# Patient Record
Sex: FEMALE | Race: OTHER | NOT HISPANIC OR LATINO | URBAN - METROPOLITAN AREA
[De-identification: names, ages, dates, MRNs, and addresses within clinical notes are randomized per-mention and may not be internally consistent; named-entity substitution may affect disease eponyms.]

---

## 2017-08-15 ENCOUNTER — EMERGENCY (EMERGENCY)
Facility: HOSPITAL | Age: 22
LOS: 1 days | Discharge: HOME | End: 2017-08-15
Admitting: EMERGENCY MEDICINE
Payer: MEDICAID

## 2017-08-15 VITALS
RESPIRATION RATE: 16 BRPM | TEMPERATURE: 99 F | OXYGEN SATURATION: 100 % | SYSTOLIC BLOOD PRESSURE: 119 MMHG | DIASTOLIC BLOOD PRESSURE: 74 MMHG | HEART RATE: 81 BPM

## 2017-08-15 PROCEDURE — 71020: CPT | Mod: 26

## 2017-08-15 PROCEDURE — 93010 ELECTROCARDIOGRAM REPORT: CPT

## 2017-08-15 PROCEDURE — 99284 EMERGENCY DEPT VISIT MOD MDM: CPT | Mod: 25

## 2017-08-15 NOTE — ED PROVIDER NOTE - CARE PLAN
Principal Discharge DX:	Muscle pain  Instructions for follow-up, activity and diet:	Follow up with your primary care provider in 48 hours.   Return to the Emergency Department for any worsening symptoms, shortness of breath, chest pain, cough, or any concerning symptoms.  Take Ibuprofen 400mg every 8 hours as needed for pain.

## 2017-08-15 NOTE — ED PROVIDER NOTE - MUSCULOSKELETAL MINIMAL EXAM
TENDERNESS/motor intact/atraumatic/normal range of motion/neck supple/point tenderness of right lower intercostals

## 2017-08-15 NOTE — ED PROVIDER NOTE - OBJECTIVE STATEMENT
22y F with no pmhx presenting with right chest wall pain x3 days, pain began suddenly, worse with deep inhalation, leaning forward, and changing position, better with sitting upright. Tried Tums and Mylanta with no relief. Denies chest pain, shortness of breath, cough, abdominal pain, nausea, vomiting, diarrhea, dysuria, headache and any other complaints. Denies OCP use, hx of travel, hx of immobility, and smoking. Pt does not remember doing any strenuous activity prior to the onset of pain. 22y F with no pmhx presenting with right chest wall pain x3 days, pain began suddenly, worse with deep inhalation, leaning forward, and changing position, better with sitting upright. Tried Tums and Mylanta with no relief. Denies shortness of breath, cough, abdominal pain, nausea, vomiting, diarrhea, dysuria, headache and any other complaints. Denies OCP use, hx of travel, hx of immobility, and smoking. Pt does not remember doing any strenuous activity prior to the onset of pain.

## 2017-08-15 NOTE — ED PROVIDER NOTE - PLAN OF CARE
Follow up with your primary care provider in 48 hours.   Return to the Emergency Department for any worsening symptoms, shortness of breath, chest pain, cough, or any concerning symptoms.  Take Ibuprofen 400mg every 8 hours as needed for pain.

## 2017-08-15 NOTE — ED PROVIDER NOTE - MEDICAL DECISION MAKING DETAILS
22yF with no pmhx presenting with right chest wall pain x3 days duration, worse with inspiration and position change. PERC negative, likely MSK w/point tenderness. Will order chest xray, given chest pain will order EKG. Will reassess

## 2017-08-15 NOTE — ED PROVIDER NOTE - PROGRESS NOTE DETAILS
YOSSI Mendez, reassessed pt in results waiting, discussed normal chest xray and normal EKG results, pain is most likely muscular, discussed taking ibuprofen 400mg every 8 hours to reduce inflammation and pain, pt agrees with plan, discussed following up with primary care provider within 48 hours, return if symptoms worsen YOSSI Finn: I have personally seen and examined this patient. I agree with the above plan of care.

## 2018-09-19 ENCOUNTER — TRANSCRIPTION ENCOUNTER (OUTPATIENT)
Age: 23
End: 2018-09-19

## 2019-06-19 ENCOUNTER — TRANSCRIPTION ENCOUNTER (OUTPATIENT)
Age: 24
End: 2019-06-19

## 2020-09-14 ENCOUNTER — ASOB RESULT (OUTPATIENT)
Age: 25
End: 2020-09-14

## 2020-09-14 ENCOUNTER — APPOINTMENT (OUTPATIENT)
Dept: ANTEPARTUM | Facility: CLINIC | Age: 25
End: 2020-09-14
Payer: COMMERCIAL

## 2020-09-14 PROBLEM — Z00.00 ENCOUNTER FOR PREVENTIVE HEALTH EXAMINATION: Status: ACTIVE | Noted: 2020-09-14

## 2020-09-14 PROCEDURE — 76813 OB US NUCHAL MEAS 1 GEST: CPT

## 2020-09-14 PROCEDURE — 36416 COLLJ CAPILLARY BLOOD SPEC: CPT

## 2020-09-14 PROCEDURE — 76801 OB US < 14 WKS SINGLE FETUS: CPT

## 2020-11-03 ENCOUNTER — ASOB RESULT (OUTPATIENT)
Age: 25
End: 2020-11-03

## 2020-11-03 ENCOUNTER — APPOINTMENT (OUTPATIENT)
Dept: ANTEPARTUM | Facility: CLINIC | Age: 25
End: 2020-11-03
Payer: COMMERCIAL

## 2020-11-03 PROCEDURE — 99072 ADDL SUPL MATRL&STAF TM PHE: CPT

## 2020-11-03 PROCEDURE — 76811 OB US DETAILED SNGL FETUS: CPT

## 2021-01-20 ENCOUNTER — OUTPATIENT (OUTPATIENT)
Dept: INPATIENT UNIT | Facility: HOSPITAL | Age: 26
LOS: 1 days | Discharge: HOME | End: 2021-01-20

## 2021-01-20 ENCOUNTER — EMERGENCY (EMERGENCY)
Facility: HOSPITAL | Age: 26
LOS: 1 days | Discharge: STILL ADMITTED OR ADMINISTRATIVE | End: 2021-01-20
Admitting: EMERGENCY MEDICINE

## 2021-01-20 VITALS
TEMPERATURE: 98 F | DIASTOLIC BLOOD PRESSURE: 84 MMHG | OXYGEN SATURATION: 100 % | HEART RATE: 93 BPM | SYSTOLIC BLOOD PRESSURE: 119 MMHG | RESPIRATION RATE: 18 BRPM

## 2021-01-20 VITALS
SYSTOLIC BLOOD PRESSURE: 109 MMHG | TEMPERATURE: 98 F | DIASTOLIC BLOOD PRESSURE: 72 MMHG | RESPIRATION RATE: 15 BRPM | HEART RATE: 106 BPM

## 2021-01-20 DIAGNOSIS — Z3A.00 WEEKS OF GESTATION OF PREGNANCY NOT SPECIFIED: ICD-10-CM

## 2021-01-20 DIAGNOSIS — O26.899 OTHER SPECIFIED PREGNANCY RELATED CONDITIONS, UNSPECIFIED TRIMESTER: ICD-10-CM

## 2021-01-20 LAB
ALBUMIN SERPL ELPH-MCNC: 3.2 G/DL — LOW (ref 3.3–5)
ALP SERPL-CCNC: 193 U/L — HIGH (ref 40–120)
ALT FLD-CCNC: 20 U/L — SIGNIFICANT CHANGE UP (ref 4–33)
ANION GAP SERPL CALC-SCNC: 12 MMOL/L — SIGNIFICANT CHANGE UP (ref 7–14)
APPEARANCE UR: ABNORMAL
AST SERPL-CCNC: 23 U/L — SIGNIFICANT CHANGE UP (ref 4–32)
BACTERIA # UR AUTO: ABNORMAL
BILIRUB SERPL-MCNC: 0.5 MG/DL — SIGNIFICANT CHANGE UP (ref 0.2–1.2)
BILIRUB UR-MCNC: NEGATIVE — SIGNIFICANT CHANGE UP
BUN SERPL-MCNC: 7 MG/DL — SIGNIFICANT CHANGE UP (ref 7–23)
CALCIUM SERPL-MCNC: 9.2 MG/DL — SIGNIFICANT CHANGE UP (ref 8.4–10.5)
CHLORIDE SERPL-SCNC: 100 MMOL/L — SIGNIFICANT CHANGE UP (ref 98–107)
CO2 SERPL-SCNC: 22 MMOL/L — SIGNIFICANT CHANGE UP (ref 22–31)
COLOR SPEC: SIGNIFICANT CHANGE UP
CREAT SERPL-MCNC: 0.45 MG/DL — LOW (ref 0.5–1.3)
DIFF PNL FLD: NEGATIVE — SIGNIFICANT CHANGE UP
EPI CELLS # UR: 2 /HPF — SIGNIFICANT CHANGE UP (ref 0–5)
GLUCOSE SERPL-MCNC: 112 MG/DL — HIGH (ref 70–99)
GLUCOSE UR QL: NEGATIVE — SIGNIFICANT CHANGE UP
HYALINE CASTS # UR AUTO: 0 /LPF — SIGNIFICANT CHANGE UP (ref 0–7)
KETONES UR-MCNC: NEGATIVE — SIGNIFICANT CHANGE UP
LEUKOCYTE ESTERASE UR-ACNC: ABNORMAL
NITRITE UR-MCNC: NEGATIVE — SIGNIFICANT CHANGE UP
PH UR: 6.5 — SIGNIFICANT CHANGE UP (ref 5–8)
POTASSIUM SERPL-MCNC: 3.8 MMOL/L — SIGNIFICANT CHANGE UP (ref 3.5–5.3)
POTASSIUM SERPL-SCNC: 3.8 MMOL/L — SIGNIFICANT CHANGE UP (ref 3.5–5.3)
PROT SERPL-MCNC: 6.5 G/DL — SIGNIFICANT CHANGE UP (ref 6–8.3)
PROT UR-MCNC: NEGATIVE — SIGNIFICANT CHANGE UP
RBC CASTS # UR COMP ASSIST: 1 /HPF — SIGNIFICANT CHANGE UP (ref 0–4)
SODIUM SERPL-SCNC: 134 MMOL/L — LOW (ref 135–145)
SP GR SPEC: 1.01 — SIGNIFICANT CHANGE UP (ref 1.01–1.02)
UROBILINOGEN FLD QL: SIGNIFICANT CHANGE UP
WBC UR QL: 56 /HPF — HIGH (ref 0–5)

## 2021-01-20 NOTE — ED ADULT TRIAGE NOTE - CHIEF COMPLAINT QUOTE
31 weeks pregnant, experiencing "a lot of uncomfortable symptoms", lower abdominal pain/pressure and total body itchiness due 3/19.  SILVER Weller accepted patient, patient to go to L&D.

## 2021-01-20 NOTE — OB RN TRIAGE NOTE - CHIEF COMPLAINT QUOTE
lower abd pain and itching all over lower abd pain and itching all over since last thurday  being treated for UTI with abx, day#1

## 2021-01-21 VITALS — SYSTOLIC BLOOD PRESSURE: 97 MMHG | HEART RATE: 79 BPM | DIASTOLIC BLOOD PRESSURE: 62 MMHG

## 2021-01-21 NOTE — OB PROVIDER TRIAGE NOTE - HISTORY OF PRESENT ILLNESS
26 y/o pt 31.5  presents to triage with c/o lower abdominal and pelvic pressure for 3-4 days. pt reports intermittent abdominal pain that comes and goes with increased pressure. pt reports pressure worsens when standing up. pt states that she is currently being treated for a UTI and started Amoxicillin on 2021. pt denies any dysuria or hematuria. pt denies any recent vaginal exams or intercourse. Pt also reports intense itching throughout her body that started on 01/15/21. pt reports that the pruritis started on her soles of the feet and then became generalized. pt states the itching intensifies at night. pt denies any rash. pt denies any n/v/d, fever or chills. pt endorses +fetal movement.   AP complicated by:  -UTI on Amoxicillin    NKDA  PMH: denies  PSH: denies  OB: denies  GYN: denies  Social hx: denies  Medications:  PNV  Amoxicillin- pt does not know dose

## 2021-01-21 NOTE — OB PROVIDER TRIAGE NOTE - NSOBPROVIDERNOTE_OBGYN_ALL_OB_FT
Plan:  -STAT CMP, Bile acids  -STAT urinalysis   -Will continue to monitor patient    @0013  d/w with Dr Leung Plan:  -STAT CMP, Bile acids  -STAT urinalysis   -Will continue to monitor patient    @0013  d/w with Dr Leung  Maternal and fetal status reassuring   Suspicion for cholestasis   Plan:  -Patient cleared for discharge  -Patient will follow up within 48 hours with ob/gyn  -Bile acids pending, will follow up with patient regarding any positive results  -Patient will continue home medications   -Fetal kick counts reviewed  - labor precautions reviewed  -Patient to increase hydration

## 2021-01-21 NOTE — OB PROVIDER TRIAGE NOTE - NSHPPHYSICALEXAM_GEN_ALL_CORE
Vital Signs Last 24 Hrs  T(C): 36.7 (20 Jan 2021 22:49), Max: 36.7 (20 Jan 2021 22:49)  T(F): 98.1 (20 Jan 2021 22:49), Max: 98.1 (20 Jan 2021 22:49)  HR: 88 (20 Jan 2021 23:28) (88 - 106)  BP: 101/80 (20 Jan 2021 23:28) (101/80 - 119/84)  BP(mean): --  RR: 15 (20 Jan 2021 22:49) (15 - 18)  SpO2: 100% (20 Jan 2021 22:25) (100% - 100%)    General: A&O x4  Abdomen: soft, non tender. no guarding or rebound tenderness.  negative CVA tenderness  SSE:  cervix appears long and closed  no active bleeding noted in vault  TVS: 4.04cm-4.07cm, no funneling or dynamic changes  TAS: BPP 8/8, CHAR 7.66cm, posterior placenta, vertex presentation, 1873gm    NST reactive with moderate variability, cat 1   toco ctx x4

## 2021-01-21 NOTE — OB PROVIDER TRIAGE NOTE - ADDITIONAL INSTRUCTIONS
-Patient will follow up within 48 hours with ob/gyn  -Bile acids pending, will follow up with patient regarding any positive results  -Patient will continue home medications   -Fetal kick counts reviewed  - labor precautions reviewed  -Patient to increase hydration

## 2021-01-21 NOTE — OB PROVIDER TRIAGE NOTE - NSHPLABSRESULTS_GEN_ALL_CORE
Urinalysis Basic - ( 2021 23:28 )    Color: Light Yellow / Appearance: Slightly Turbid / S.011 / pH: x  Gluc: x / Ketone: Negative  / Bili: Negative / Urobili: <2 mg/dL   Blood: x / Protein: Negative / Nitrite: Negative   Leuk Esterase: Large / RBC: 1 /HPF / WBC 56 /HPF   Sq Epi: x / Non Sq Epi: 2 /HPF / Bacteria: Few        134<L>  |  100  |  7   ----------------------------<  112<H>  3.8   |  22  |  0.45<L>    Ca    9.2      2021 23:28    TPro  6.5  /  Alb  3.2<L>  /  TBili  0.5  /  DBili  x   /  AST  23  /  ALT  20  /  AlkPhos  193<H>

## 2021-01-22 LAB — BILE AC FLD-MCNC: 21.8 UMOL/L — HIGH (ref 0–10)

## 2021-02-08 ENCOUNTER — INPATIENT (INPATIENT)
Facility: HOSPITAL | Age: 26
LOS: 1 days | Discharge: HOME | End: 2021-02-10
Attending: OBSTETRICS & GYNECOLOGY | Admitting: OBSTETRICS & GYNECOLOGY

## 2021-02-08 VITALS
DIASTOLIC BLOOD PRESSURE: 74 MMHG | RESPIRATION RATE: 16 BRPM | SYSTOLIC BLOOD PRESSURE: 116 MMHG | TEMPERATURE: 98 F | HEART RATE: 95 BPM

## 2021-02-08 DIAGNOSIS — Z3A.00 WEEKS OF GESTATION OF PREGNANCY NOT SPECIFIED: ICD-10-CM

## 2021-02-08 DIAGNOSIS — O26.899 OTHER SPECIFIED PREGNANCY RELATED CONDITIONS, UNSPECIFIED TRIMESTER: ICD-10-CM

## 2021-02-08 LAB
ALBUMIN SERPL ELPH-MCNC: 3.3 G/DL — SIGNIFICANT CHANGE UP (ref 3.3–5)
ALP SERPL-CCNC: 279 U/L — HIGH (ref 40–120)
ALT FLD-CCNC: 20 U/L — SIGNIFICANT CHANGE UP (ref 4–33)
ANION GAP SERPL CALC-SCNC: 12 MMOL/L — SIGNIFICANT CHANGE UP (ref 7–14)
APPEARANCE UR: ABNORMAL
APTT BLD: 25.9 SEC — LOW (ref 27–36.3)
AST SERPL-CCNC: 18 U/L — SIGNIFICANT CHANGE UP (ref 4–32)
BASOPHILS # BLD AUTO: 0.05 K/UL — SIGNIFICANT CHANGE UP (ref 0–0.2)
BASOPHILS NFR BLD AUTO: 0.6 % — SIGNIFICANT CHANGE UP (ref 0–2)
BILIRUB SERPL-MCNC: 0.4 MG/DL — SIGNIFICANT CHANGE UP (ref 0.2–1.2)
BILIRUB UR-MCNC: NEGATIVE — SIGNIFICANT CHANGE UP
BLD GP AB SCN SERPL QL: NEGATIVE — SIGNIFICANT CHANGE UP
BUN SERPL-MCNC: 6 MG/DL — LOW (ref 7–23)
CALCIUM SERPL-MCNC: 8.9 MG/DL — SIGNIFICANT CHANGE UP (ref 8.4–10.5)
CHLORIDE SERPL-SCNC: 101 MMOL/L — SIGNIFICANT CHANGE UP (ref 98–107)
CO2 SERPL-SCNC: 22 MMOL/L — SIGNIFICANT CHANGE UP (ref 22–31)
COLOR SPEC: SIGNIFICANT CHANGE UP
CREAT ?TM UR-MCNC: 47 MG/DL — SIGNIFICANT CHANGE UP
CREAT SERPL-MCNC: 0.43 MG/DL — LOW (ref 0.5–1.3)
DIFF PNL FLD: NEGATIVE — SIGNIFICANT CHANGE UP
EOSINOPHIL # BLD AUTO: 0.08 K/UL — SIGNIFICANT CHANGE UP (ref 0–0.5)
EOSINOPHIL NFR BLD AUTO: 0.9 % — SIGNIFICANT CHANGE UP (ref 0–6)
FIBRINOGEN PPP-MCNC: 760 MG/DL — HIGH (ref 290–520)
GLUCOSE SERPL-MCNC: 77 MG/DL — SIGNIFICANT CHANGE UP (ref 70–99)
GLUCOSE UR QL: NEGATIVE — SIGNIFICANT CHANGE UP
HCT VFR BLD CALC: 37.3 % — SIGNIFICANT CHANGE UP (ref 34.5–45)
HGB BLD-MCNC: 11.5 G/DL — SIGNIFICANT CHANGE UP (ref 11.5–15.5)
IANC: 5.24 K/UL — SIGNIFICANT CHANGE UP (ref 1.5–8.5)
IMM GRANULOCYTES NFR BLD AUTO: 0.6 % — SIGNIFICANT CHANGE UP (ref 0–1.5)
INR BLD: 0.98 RATIO — SIGNIFICANT CHANGE UP (ref 0.88–1.16)
KETONES UR-MCNC: NEGATIVE — SIGNIFICANT CHANGE UP
LDH SERPL L TO P-CCNC: 197 U/L — SIGNIFICANT CHANGE UP (ref 135–225)
LEUKOCYTE ESTERASE UR-ACNC: ABNORMAL
LYMPHOCYTES # BLD AUTO: 2.35 K/UL — SIGNIFICANT CHANGE UP (ref 1–3.3)
LYMPHOCYTES # BLD AUTO: 27.4 % — SIGNIFICANT CHANGE UP (ref 13–44)
MCHC RBC-ENTMCNC: 27 PG — SIGNIFICANT CHANGE UP (ref 27–34)
MCHC RBC-ENTMCNC: 30.8 GM/DL — LOW (ref 32–36)
MCV RBC AUTO: 87.6 FL — SIGNIFICANT CHANGE UP (ref 80–100)
MONOCYTES # BLD AUTO: 0.82 K/UL — SIGNIFICANT CHANGE UP (ref 0–0.9)
MONOCYTES NFR BLD AUTO: 9.5 % — SIGNIFICANT CHANGE UP (ref 2–14)
NEUTROPHILS # BLD AUTO: 5.24 K/UL — SIGNIFICANT CHANGE UP (ref 1.8–7.4)
NEUTROPHILS NFR BLD AUTO: 61 % — SIGNIFICANT CHANGE UP (ref 43–77)
NITRITE UR-MCNC: NEGATIVE — SIGNIFICANT CHANGE UP
NRBC # BLD: 0 /100 WBCS — SIGNIFICANT CHANGE UP
NRBC # FLD: 0 K/UL — SIGNIFICANT CHANGE UP
PH UR: 6.5 — SIGNIFICANT CHANGE UP (ref 5–8)
PLATELET # BLD AUTO: 198 K/UL — SIGNIFICANT CHANGE UP (ref 150–400)
POTASSIUM SERPL-MCNC: 3.7 MMOL/L — SIGNIFICANT CHANGE UP (ref 3.5–5.3)
POTASSIUM SERPL-SCNC: 3.7 MMOL/L — SIGNIFICANT CHANGE UP (ref 3.5–5.3)
PROT ?TM UR-MCNC: 19 MG/DL — SIGNIFICANT CHANGE UP
PROT SERPL-MCNC: 6.7 G/DL — SIGNIFICANT CHANGE UP (ref 6–8.3)
PROT UR-MCNC: ABNORMAL
PROT/CREAT UR-RTO: 0.4 RATIO — HIGH (ref 0–0.2)
PROTHROM AB SERPL-ACNC: 11.3 SEC — SIGNIFICANT CHANGE UP (ref 10.6–13.6)
RBC # BLD: 4.26 M/UL — SIGNIFICANT CHANGE UP (ref 3.8–5.2)
RBC # FLD: 13.3 % — SIGNIFICANT CHANGE UP (ref 10.3–14.5)
RH IG SCN BLD-IMP: POSITIVE — SIGNIFICANT CHANGE UP
RH IG SCN BLD-IMP: POSITIVE — SIGNIFICANT CHANGE UP
SODIUM SERPL-SCNC: 135 MMOL/L — SIGNIFICANT CHANGE UP (ref 135–145)
SP GR SPEC: 1.01 — SIGNIFICANT CHANGE UP (ref 1.01–1.02)
URATE SERPL-MCNC: 3.4 MG/DL — SIGNIFICANT CHANGE UP (ref 2.5–7)
UROBILINOGEN FLD QL: SIGNIFICANT CHANGE UP
WBC # BLD: 8.59 K/UL — SIGNIFICANT CHANGE UP (ref 3.8–10.5)
WBC # FLD AUTO: 8.59 K/UL — SIGNIFICANT CHANGE UP (ref 3.8–10.5)

## 2021-02-08 RX ORDER — AMOXICILLIN 250 MG/5ML
0 SUSPENSION, RECONSTITUTED, ORAL (ML) ORAL
Qty: 0 | Refills: 0 | DISCHARGE

## 2021-02-08 RX ORDER — FOLIC ACID 0.8 MG
1 TABLET ORAL DAILY
Refills: 0 | Status: DISCONTINUED | OUTPATIENT
Start: 2021-02-08 | End: 2021-02-08

## 2021-02-09 ENCOUNTER — OUTPATIENT (OUTPATIENT)
Dept: OUTPATIENT SERVICES | Facility: HOSPITAL | Age: 26
LOS: 1 days | End: 2021-02-09

## 2021-02-09 ENCOUNTER — APPOINTMENT (OUTPATIENT)
Dept: ANTEPARTUM | Facility: HOSPITAL | Age: 26
End: 2021-02-09
Payer: COMMERCIAL

## 2021-02-09 ENCOUNTER — ASOB RESULT (OUTPATIENT)
Age: 26
End: 2021-02-09

## 2021-02-09 DIAGNOSIS — K83.1 OBSTRUCTION OF BILE DUCT: ICD-10-CM

## 2021-02-09 LAB
ALBUMIN SERPL ELPH-MCNC: 3.1 G/DL — LOW (ref 3.3–5)
ALP SERPL-CCNC: 250 U/L — HIGH (ref 40–120)
ALT FLD-CCNC: 20 U/L — SIGNIFICANT CHANGE UP (ref 4–33)
ANION GAP SERPL CALC-SCNC: 12 MMOL/L — SIGNIFICANT CHANGE UP (ref 7–14)
AST SERPL-CCNC: 17 U/L — SIGNIFICANT CHANGE UP (ref 4–32)
BILIRUB SERPL-MCNC: 0.4 MG/DL — SIGNIFICANT CHANGE UP (ref 0.2–1.2)
BUN SERPL-MCNC: 7 MG/DL — SIGNIFICANT CHANGE UP (ref 7–23)
CALCIUM SERPL-MCNC: 8.7 MG/DL — SIGNIFICANT CHANGE UP (ref 8.4–10.5)
CHLORIDE SERPL-SCNC: 102 MMOL/L — SIGNIFICANT CHANGE UP (ref 98–107)
CO2 SERPL-SCNC: 19 MMOL/L — LOW (ref 22–31)
CREAT SERPL-MCNC: 0.44 MG/DL — LOW (ref 0.5–1.3)
GLUCOSE SERPL-MCNC: 158 MG/DL — HIGH (ref 70–99)
POTASSIUM SERPL-MCNC: 4 MMOL/L — SIGNIFICANT CHANGE UP (ref 3.5–5.3)
POTASSIUM SERPL-SCNC: 4 MMOL/L — SIGNIFICANT CHANGE UP (ref 3.5–5.3)
PROT SERPL-MCNC: 6.5 G/DL — SIGNIFICANT CHANGE UP (ref 6–8.3)
SARS-COV-2 RNA SPEC QL NAA+PROBE: SIGNIFICANT CHANGE UP
SODIUM SERPL-SCNC: 133 MMOL/L — LOW (ref 135–145)
T PALLIDUM AB TITR SER: NEGATIVE — SIGNIFICANT CHANGE UP

## 2021-02-09 PROCEDURE — 76819 FETAL BIOPHYS PROFIL W/O NST: CPT | Mod: 26

## 2021-02-09 PROCEDURE — 76816 OB US FOLLOW-UP PER FETUS: CPT | Mod: 26

## 2021-02-09 RX ORDER — URSODIOL 250 MG/1
300 TABLET, FILM COATED ORAL EVERY 8 HOURS
Refills: 0 | Status: DISCONTINUED | OUTPATIENT
Start: 2021-02-09 | End: 2021-02-09

## 2021-02-09 RX ORDER — URSODIOL 250 MG/1
600 TABLET, FILM COATED ORAL EVERY 12 HOURS
Refills: 0 | Status: DISCONTINUED | OUTPATIENT
Start: 2021-02-09 | End: 2021-02-10

## 2021-02-09 RX ORDER — FOLIC ACID 0.8 MG
1 TABLET ORAL DAILY
Refills: 0 | Status: DISCONTINUED | OUTPATIENT
Start: 2021-02-09 | End: 2021-02-09

## 2021-02-09 RX ADMIN — URSODIOL 300 MILLIGRAM(S): 250 TABLET, FILM COATED ORAL at 05:55

## 2021-02-09 RX ADMIN — URSODIOL 600 MILLIGRAM(S): 250 TABLET, FILM COATED ORAL at 18:20

## 2021-02-09 RX ADMIN — Medication 12 MILLIGRAM(S): at 00:50

## 2021-02-09 NOTE — PROGRESS NOTE ADULT - ATTENDING COMMENTS
Patient seen at bedside. Agree with above note. Continue betamethasone course and ursodiol. For MFM consult this AM.

## 2021-02-09 NOTE — OB PROVIDER H&P - ASSESSMENT
24 yo  at [id number] admitted with ICP, bile acids 21.8 () -> 203 (). Reactive NST, BPP . LFTs wnl.   - Admit to antepartum for AM MFM consult   - BMZ for fetal lung maturity   - Continue Ursodiol 300 TID    - NST BID  - Reg diet     E Demirel PGY3  d/w Dr. Mustafa and Dr. Leung

## 2021-02-09 NOTE — OB PROVIDER H&P - HISTORY OF PRESENT ILLNESS
24 yo  at [id number] sent in from the office for elevated bile acids to 203 drawn on , increased from 21.8 on  when patient presented to L&D triage for generalized body pruritus that started on her soles. She reports improved pruritus currently on Ursodiol. She denies rashes, abdominal pain, N/V, HA, changes in vision, lightheadedness, dizziness. Denies ctx, LOF, VB and endorses good FM.     OBhx: P0   GYN: denies  PMHx: denies  PSHx: denies  Social hx: denies tobacco, alcohol, recreational drugs   Medications: Ursodiol 300 TID, PNV, Vit D   Allergy: Benadryl (angioedema)

## 2021-02-09 NOTE — PROGRESS NOTE ADULT - ASSESSMENT
Assessment: 25 yr  @ 34 4/7 with cholestasis of pregnancy (bile acid 203). No evidence of tranaminities. No evidence of fetal distress     Recommendations    Patient was counseled on the diagnosis of cholestasis of pregnancy's Patient was also counseled that bile acids >100 is associated with a significantly higher risk of stillbirth. At this time, we recommend delivery at 36 weeks gestation (36  0/7 to 36 3/7)   Will obtain ATU sonogram today   Complete BMZ course   If fetal testing continues to remain reassuring can consider outpatient management  Would recommend 3x a week outpatient fetal testing         Discussed with Dr. Woodall ([facility] Attending)     Andrzej Castillo MD   Maternal Fetal Medicine Fellow

## 2021-02-09 NOTE — DISCHARGE NOTE ANTEPARTUM - CARE PROVIDER_API CALL
Man hBatt)  Obstetrics and Gynecology  [address] [place] [address]  Phone: [phone]  Fax: [phone]  Follow Up Time:

## 2021-02-09 NOTE — DISCHARGE NOTE ANTEPARTUM - CARE PLAN
Principal Discharge DX:	Cholestasis  Goal:	Recovery  Assessment and plan of treatment:	- Follow up with  testing unit on  at 8am for NST and BPP then follow up three times a week until scheduled delivery at 36 weeks  - Return to hospital with contractions, vaginal bleeding, leaking fluid or decreased fetal movement   Principal Discharge DX:	Cholestasis  Goal:	Recovery  Assessment and plan of treatment:	- Follow up with:  OB Office at [place] tomorrow  at 130pm   testing unit on  at 8am for NST and BPP  [place] Office on Monday 2/15 at 7am   testing unit on  at 1:30pm  Induction to be scheduled  at 36 weeks  - Return to hospital with contractions, vaginal bleeding, leaking fluid or decreased fetal movement

## 2021-02-09 NOTE — DISCHARGE NOTE ANTEPARTUM - MEDICATION SUMMARY - MEDICATIONS TO CHANGE
I will SWITCH the dose or number of times a day I take the medications listed below when I get home from the hospital:    ursodiol 300 mg oral capsule  -- 1 cap(s) by mouth 2 times a day

## 2021-02-09 NOTE — DISCHARGE NOTE ANTEPARTUM - HOSPITAL COURSE
Patient is a 25 yr  @ 34  admitted secondary to concern elevated bile acids. Patient was previously diagnosed with cholestasis of pregnancy with a bile acid of 21. She experiences pruritis She was started on ursodiol with improvement in symptoms. Repeat bile acid resulted as 203 yesterday. Provider initiated betametahsone course. [facility] :  MFM consulted and recommends delivery at 36 weeks, increase ursidiol to 600mg BID, and three times per week fetal testing until delivery while outpatient.  Patient is a 25 yr  @ 34  admitted secondary to concern elevated bile acids. Patient was previously diagnosed with cholestasis of pregnancy with a bile acid of 21. She experiences pruritis She was started on ursodiol with improvement in symptoms. Repeat bile acid resulted as 203 yesterday. Provider initiated betametahsone course. ATU sono : Vtx/ posterior placenta/ CHAR 15/ BPP 8/8 ATU sono 2/10: CHAR 19 BPP 8/8. NST reactive. MFM consulted and recommends delivery at 36 weeks, increase ursidiol to 600mg BID, and three times per week fetal testing until delivery while outpatient. Pt stable for discharge with follow up in office tomorrow, Friday and three times next week with scheduled IOL on .

## 2021-02-09 NOTE — PROGRESS NOTE ADULT - ASSESSMENT
Patient is a 26yo  @34w4d a/w ICP overnight (), . Patient hemodynamically stable this am .....    #ICP  -Ursodial 300 TID started   -Labs otherwise wnl on admission??    #FWB  -BMZ (-)  -AM ATU sono  -NST BID    #MWB  -HSQ  -Reg diet     Patient is a 26yo  @34w4d a/w ICP overnight (), . Patient hemodynamically stable this am.    #ICP  -Ursodial 300 TID started   -Labs otherwise wnl on admission  -MFM consult and ATU scan this am    #FWB  -BMZ (-)  -AM ATU sono  -NST BID    #MWB  -HSQ  -Reg joseph Otto, PGY3

## 2021-02-09 NOTE — DISCHARGE NOTE ANTEPARTUM - MEDICATION SUMMARY - MEDICATIONS TO TAKE
I will START or STAY ON the medications listed below when I get home from the hospital:    ursodiol 300 mg oral capsule  -- 2 cap(s) by mouth every 12 hours  -- Indication: For ICP    Prenatal Multivitamins with Folic Acid 1 mg oral tablet  -- 1 tab(s) by mouth once a day  -- Indication: For pregnancy

## 2021-02-09 NOTE — PROGRESS NOTE ADULT - SUBJECTIVE AND OBJECTIVE BOX
MFM Fellow Note     Patient is a 25 yr  @ 34  admitted secondary to concern elevated bile acids. Patient was previously diagnosed withcholestasis of pregnancy with a bile acid of 21. She experiences pruritis She was started on ursodiol with improvement in symptoms. Repeat bile acid resulted as 203 yesterday. Patient presented for evaluation NST yesterday was reactive. Provider initiated betametahsone course.   Patient history is otherwise not significant       Vital Signs Last 24 Hours  T(C): 36.3 (21 @ 05:53), Max: 36.6 (21 @ 22:25)  HR: 87 (21 @ 05:53) (85 - 95)  BP: 105/56 (21 @ 05:53) (104/69 - 116/74)  RR: 18 (21 @ 05:53) (15 - 18)  SpO2: 97% (21 @ 05:53) (97% - 100%)      Physical Exam:  General: NAD      Labs:                        11.5   8.59  )-----------( 198      ( 2021 21:32 )             37.3   baso 0.6    eos 0.9    imm gran 0.6    lymph 27.4   mono 9.5    poly 61.0         135  |  101  |  6<L>  ----------------------------<  77  3.7   |  22  |  0.43<L>    Ca    8.9      2021 21:32    TPro  6.7  /  Alb  3.3  /  TBili  0.4  /  DBili  x   /  AST  18  /  ALT  20  /  AlkPhos  279<H>  -    PT/INR - ( 2021 22:09 )   PT: 11.3 sec;   INR: 0.98 ratio         PTT - ( 2021 22:09 )  PTT:25.9 sec  Urinalysis Basic - ( 2021 21:32 )    Color: Light Yellow / Appearance: Slightly Turbid / S.010 / pH: x  Gluc: x / Ketone: Negative  / Bili: Negative / Urobili: <2 mg/dL   Blood: x / Protein: Trace / Nitrite: Negative   Leuk Esterase: Large / RBC: 2 /HPF / WBC 88 /HPF   Sq Epi: x / Non Sq Epi: 6 /HPF / Bacteria: Moderate        Blood Type: AB Positive      MEDICATIONS  (STANDING):  betamethasone Injectable 12 milliGRAM(s) IntraMuscular every 24 hours  folic acid 1 milliGRAM(s) Oral daily  prenatal multivitamin 1 Tablet(s) Oral daily  ursodiol Capsule 300 milliGRAM(s) Oral every 8 hours    MEDICATIONS  (PRN):        Andrzej Castillo MD

## 2021-02-09 NOTE — OB PROVIDER H&P - NSHPPHYSICALEXAM_GEN_ALL_CORE
Physical Exam:   General: laying comfortably in bed, NAD, AOx3   CV: RRR    Lungs: CTA b/l, good air flow b/l   Back: No CVA tenderness  Abd: soft, gravid, NTND, no rebound or guarding   Pelvic: deferred   Ext: NT b/l, no edema

## 2021-02-09 NOTE — DISCHARGE NOTE ANTEPARTUM - PLAN OF CARE
- Follow up with  testing unit on  at 8am for NST and BPP then follow up three times a week until scheduled delivery at 36 weeks  - Return to hospital with contractions, vaginal bleeding, leaking fluid or decreased fetal movement Recovery - Follow up with:  OB Office at [place] at 130pm   testing unit on  at 8am for NST and BPP  [place] Office on Monday 2/15 at 7am   testing unit on  at 1:30pm  Induction to be scheduled  at 36 weeks  - Return to hospital with contractions, vaginal bleeding, leaking fluid or decreased fetal movement

## 2021-02-09 NOTE — OB PROVIDER H&P - NSHPLABSRESULTS_GEN_ALL_CORE
Vital Signs Last 24 Hrs  T(C): 36.6 (2021 22:30), Max: 36.6 (2021 22:25)  T(F): 97.88 (2021 22:30), Max: 97.9 (2021 22:25)  HR: 90 (2021 22:30) (90 - 95)  BP: 107/59 (2021 22:30) (107/59 - 116/74)  BP(mean): --  RR: 15 (2021 22:25) (15 - 16)  SpO2: --                          11.5   8.59  )-----------( 198      ( 2021 21:32 )             37.3       02-08    135  |  101  |  6<L>  ----------------------------<  77  3.7   |  22  |  0.43<L>    Ca    8.9      2021 21:32    TPro  6.7  /  Alb  3.3  /  TBili  0.4  /  DBili  x   /  AST  18  /  ALT  20  /  AlkPhos  279<H>  02-08      CAPILLARY BLOOD GLUCOSE    LIVER FUNCTIONS - ( 2021 21:32 )  Alb: 3.3 g/dL / Pro: 6.7 g/dL / ALK PHOS: 279 U/L / ALT: 20 U/L / AST: 18 U/L / GGT: x           PT/INR - ( 2021 22:09 )   PT: 11.3 sec;   INR: 0.98 ratio    PTT - ( 2021 22:09 )  PTT:25.9 sec    Urinalysis Basic - ( 2021 21:32 )    Color: Light Yellow / Appearance: Slightly Turbid / S.010 / pH: x  Gluc: x / Ketone: Negative  / Bili: Negative / Urobili: <2 mg/dL   Blood: x / Protein: Trace / Nitrite: Negative   Leuk Esterase: Large / RBC: 2 /HPF / WBC 88 /HPF   Sq Epi: x / Non Sq Epi: 6 /HPF / Bacteria: Moderate

## 2021-02-09 NOTE — PROGRESS NOTE ADULT - SUBJECTIVE AND OBJECTIVE BOX
Patient seen and examined at bedside, no acute overnight events. No acute complaints. Patient endorses good fetal movement. Patient is ambulating and tolerating regular diet. Denies CP, SOB, N/V, fevers, chills, HA, visual disturbances, CTX, LOF, VB or abdominal pain.    Vital Signs Last 24 Hours  T(C): 36.5 (02-09-21 @ 01:27), Max: 36.6 (02-08-21 @ 22:25)  HR: 85 (02-09-21 @ 01:27) (85 - 95)  BP: 104/69 (02-09-21 @ 01:27) (104/69 - 116/74)  RR: 18 (02-09-21 @ 01:27) (15 - 18)  SpO2: 100% (02-09-21 @ 01:27) (100% - 100%)    I&O's Summary      Physical Exam:  General: NAD  CV: S1/S2+,RRR  Lungs: breathing comfortably on RA, CTA  Abdomen: soft, gravid, non-tender  Ext: NTTP, No erythema or edema    Labs:             11.5   8.59  )-----------( 198      ( 02-08 @ 21:32 )             37.3         MEDICATIONS  (STANDING):  betamethasone Injectable 12 milliGRAM(s) IntraMuscular every 24 hours  folic acid 1 milliGRAM(s) Oral daily  prenatal multivitamin 1 Tablet(s) Oral daily  ursodiol Capsule 300 milliGRAM(s) Oral every 8 hours    MEDICATIONS  (PRN):   Patient seen and examined at bedside, no acute overnight events. No acute complaints. Patient reports mild itching this am, which has since resolved s/p ursodiol administration. Patient reports itching has greatly improved outpatient since starting ursodiol. Patient endorses good fetal movement. Patient is ambulating and tolerating regular diet. Denies CP, SOB, N/V, fevers, chills, HA, visual disturbances, CTX, LOF, VB or abdominal pain.    Vital Signs Last 24 Hours  T(C): 36.5 (02-09-21 @ 01:27), Max: 36.6 (02-08-21 @ 22:25)  HR: 85 (02-09-21 @ 01:27) (85 - 95)  BP: 104/69 (02-09-21 @ 01:27) (104/69 - 116/74)  RR: 18 (02-09-21 @ 01:27) (15 - 18)  SpO2: 100% (02-09-21 @ 01:27) (100% - 100%)    I&O's Summary      Physical Exam:  General: NAD  CV: S1/S2+,RRR  Lungs: breathing comfortably on RA, CTA  Abdomen: soft, gravid, non-tender  Ext: NTTP, No erythema or edema    Labs:             11.5   8.59  )-----------( 198      ( 02-08 @ 21:32 )             37.3         MEDICATIONS  (STANDING):  betamethasone Injectable 12 milliGRAM(s) IntraMuscular every 24 hours  folic acid 1 milliGRAM(s) Oral daily  prenatal multivitamin 1 Tablet(s) Oral daily  ursodiol Capsule 300 milliGRAM(s) Oral every 8 hours    MEDICATIONS  (PRN):

## 2021-02-09 NOTE — DISCHARGE NOTE ANTEPARTUM - ADDITIONAL INSTRUCTIONS
- Follow up with:  OB Office at [place] tomorrow  at 130pm   testing unit on  at 8am for NST and BPP  [place] Office on Monday 2/15 at 7am   testing unit on  at 1:30pm  Induction to be scheduled  at 36 weeks

## 2021-02-09 NOTE — DISCHARGE NOTE ANTEPARTUM - PATIENT PORTAL LINK FT
You can access the FollowMyHealth Patient Portal offered by [facility] by registering at the following website: [email or web address]. By joining [employer]’s FollowMyHealth portal, you will also be able to view your health information using other applications (apps) compatible with our system.

## 2021-02-10 ENCOUNTER — ASOB RESULT (OUTPATIENT)
Age: 26
End: 2021-02-10

## 2021-02-10 ENCOUNTER — TRANSCRIPTION ENCOUNTER (OUTPATIENT)
Age: 26
End: 2021-02-10

## 2021-02-10 ENCOUNTER — APPOINTMENT (OUTPATIENT)
Dept: ANTEPARTUM | Facility: HOSPITAL | Age: 26
End: 2021-02-10

## 2021-02-10 ENCOUNTER — OUTPATIENT (OUTPATIENT)
Dept: OUTPATIENT SERVICES | Facility: HOSPITAL | Age: 26
LOS: 1 days | End: 2021-02-10

## 2021-02-10 VITALS
DIASTOLIC BLOOD PRESSURE: 62 MMHG | RESPIRATION RATE: 18 BRPM | OXYGEN SATURATION: 98 % | HEART RATE: 97 BPM | SYSTOLIC BLOOD PRESSURE: 110 MMHG | TEMPERATURE: 98 F

## 2021-02-10 PROBLEM — K83.1 OBSTRUCTION OF BILE DUCT: Chronic | Status: ACTIVE | Noted: 2021-02-08

## 2021-02-10 LAB
SARS-COV-2 IGG SERPL QL IA: NEGATIVE — SIGNIFICANT CHANGE UP
SARS-COV-2 IGM SERPL IA-ACNC: <0.1 INDEX — SIGNIFICANT CHANGE UP

## 2021-02-10 RX ORDER — URSODIOL 250 MG/1
1 TABLET, FILM COATED ORAL
Qty: 0 | Refills: 0 | DISCHARGE

## 2021-02-10 RX ORDER — URSODIOL 250 MG/1
2 TABLET, FILM COATED ORAL
Qty: 0 | Refills: 0 | DISCHARGE
Start: 2021-02-10

## 2021-02-10 RX ADMIN — URSODIOL 600 MILLIGRAM(S): 250 TABLET, FILM COATED ORAL at 06:23

## 2021-02-10 RX ADMIN — Medication 12 MILLIGRAM(S): at 00:26

## 2021-02-10 NOTE — PROGRESS NOTE ADULT - ASSESSMENT
Patient is a 24yo  @34w5d a/w ICP overnight (), . Patient hemodynamically stable this am.    #ICP  -Ursodial 300 TID outpatient increased to 600BID  -Labs wnl   -As patient is now beta complete with discuss with MFM and private obgyn regarding discharge home. Plan for delivery at 36w with 3x weekly ATU testing on discharge    #FWB  -BMZ (-)  -AM ATU sono  -NST BID  -ATU sono ():       #MWB  -HSQ  -Reg diet    Shi Otto, PGY3 Patient is a 24yo  @34w5d a/w ICP overnight (), . Patient hemodynamically stable this am.    #ICP  -Ursodial 300 TID outpatient increased to 600BID  -Labs wnl   -As patient is now beta complete, discharge planing. To follow up in ATU with 3x weekly testing. Plan for delivery at 36w.    #FWB  -BMZ (-)  -ATU sono () Vtx, posterior placenta, Juan J 15.5, 2872g(84%), BPP   -NST BID    #MWB  -HSQ  -Reg diet    Shimarli Otto, PGY3    Shi Otto, PGY3

## 2021-02-10 NOTE — PROGRESS NOTE ADULT - ATTENDING COMMENTS
patient to have NST/BPP today  if above testing is reassuring and patient is for discharge:  she will follow up in the office tomorrow  she will be seen in ATU on 2/12 (Friday)  she will be seen in the office 2/15 (Monday)  she will be seen in ATU on 2/17 (Wednesday)  she will be for IOL @ 36 wks (2/19 Friday)  plan reviewed with patient, will coordinate with office and MFM ATU to schedule

## 2021-02-10 NOTE — PROGRESS NOTE ADULT - SUBJECTIVE AND OBJECTIVE BOX
Patient seen and examined at bedside, no acute overnight events. No acute complaints. Patient endorses good fetal movement. Patient is ambulating and tolerating regular diet. Denies CP, SOB, N/V, fevers, chills, HA, visual disturbances, CTX, LOF, VB or abdominal pain.    Vital Signs Last 24 Hours  T(C): 36.5 (02-10-21 @ 01:33), Max: 36.9 (02-09-21 @ 18:12)  HR: 86 (02-10-21 @ 01:33) (79 - 111)  BP: 101/59 (02-10-21 @ 01:33) (101/59 - 107/65)  RR: 18 (02-10-21 @ 01:33) (16 - 18)  SpO2: 97% (02-10-21 @ 01:33) (96% - 98%)    I&O's Summary      Physical Exam:  General: NAD  CV: S1/S2+,RRR  Lungs: breathing comfortably on RA, CTA  Abdomen: soft, gravid, non-tender  Ext: NTTP, No erythema or edema    Labs:             11.5   8.59  )-----------( 198      ( 02-08 @ 21:32 )             37.3         MEDICATIONS  (STANDING):  prenatal multivitamin 1 Tablet(s) Oral daily  ursodiol Capsule 600 milliGRAM(s) Oral every 12 hours    MEDICATIONS  (PRN):

## 2021-02-11 LAB — BILE AC FLD-MCNC: 8.8 UMOL/L — SIGNIFICANT CHANGE UP (ref 0–10)

## 2021-02-12 ENCOUNTER — APPOINTMENT (OUTPATIENT)
Dept: ANTEPARTUM | Facility: CLINIC | Age: 26
End: 2021-02-12
Payer: COMMERCIAL

## 2021-02-12 ENCOUNTER — ASOB RESULT (OUTPATIENT)
Age: 26
End: 2021-02-12

## 2021-02-12 ENCOUNTER — APPOINTMENT (OUTPATIENT)
Dept: ANTEPARTUM | Facility: HOSPITAL | Age: 26
End: 2021-02-12

## 2021-02-12 PROCEDURE — 76819 FETAL BIOPHYS PROFIL W/O NST: CPT

## 2021-02-12 PROCEDURE — 99072 ADDL SUPL MATRL&STAF TM PHE: CPT

## 2021-02-17 ENCOUNTER — LABORATORY RESULT (OUTPATIENT)
Age: 26
End: 2021-02-17

## 2021-02-17 ENCOUNTER — APPOINTMENT (OUTPATIENT)
Dept: DISASTER EMERGENCY | Facility: CLINIC | Age: 26
End: 2021-02-17

## 2021-02-17 ENCOUNTER — OUTPATIENT (OUTPATIENT)
Dept: OUTPATIENT SERVICES | Facility: HOSPITAL | Age: 26
LOS: 1 days | End: 2021-02-17

## 2021-02-17 ENCOUNTER — ASOB RESULT (OUTPATIENT)
Age: 26
End: 2021-02-17

## 2021-02-17 ENCOUNTER — APPOINTMENT (OUTPATIENT)
Dept: ANTEPARTUM | Facility: HOSPITAL | Age: 26
End: 2021-02-17
Payer: COMMERCIAL

## 2021-02-17 ENCOUNTER — APPOINTMENT (OUTPATIENT)
Dept: ANTEPARTUM | Facility: CLINIC | Age: 26
End: 2021-02-17
Payer: COMMERCIAL

## 2021-02-17 PROCEDURE — 76819 FETAL BIOPHYS PROFIL W/O NST: CPT | Mod: 26

## 2021-02-17 PROCEDURE — 76818 FETAL BIOPHYS PROFILE W/NST: CPT | Mod: 26

## 2021-02-19 ENCOUNTER — INPATIENT (INPATIENT)
Facility: HOSPITAL | Age: 26
LOS: 2 days | Discharge: HOME | End: 2021-02-22
Attending: OBSTETRICS & GYNECOLOGY | Admitting: OBSTETRICS & GYNECOLOGY

## 2021-02-19 VITALS — SYSTOLIC BLOOD PRESSURE: 114 MMHG | DIASTOLIC BLOOD PRESSURE: 69 MMHG | HEART RATE: 100 BPM

## 2021-02-19 DIAGNOSIS — K81.9 CHOLECYSTITIS, UNSPECIFIED: ICD-10-CM

## 2021-02-19 LAB
ALBUMIN SERPL ELPH-MCNC: 3.4 G/DL — SIGNIFICANT CHANGE UP (ref 3.3–5)
ALP SERPL-CCNC: 233 U/L — HIGH (ref 40–120)
ALT FLD-CCNC: 15 U/L — SIGNIFICANT CHANGE UP (ref 4–33)
ANION GAP SERPL CALC-SCNC: 10 MMOL/L — SIGNIFICANT CHANGE UP (ref 7–14)
AST SERPL-CCNC: 17 U/L — SIGNIFICANT CHANGE UP (ref 4–32)
BASOPHILS # BLD AUTO: 0.07 K/UL — SIGNIFICANT CHANGE UP (ref 0–0.2)
BASOPHILS NFR BLD AUTO: 0.8 % — SIGNIFICANT CHANGE UP (ref 0–2)
BILIRUB SERPL-MCNC: 0.4 MG/DL — SIGNIFICANT CHANGE UP (ref 0.2–1.2)
BLD GP AB SCN SERPL QL: NEGATIVE — SIGNIFICANT CHANGE UP
BUN SERPL-MCNC: 6 MG/DL — LOW (ref 7–23)
CALCIUM SERPL-MCNC: 9 MG/DL — SIGNIFICANT CHANGE UP (ref 8.4–10.5)
CHLORIDE SERPL-SCNC: 102 MMOL/L — SIGNIFICANT CHANGE UP (ref 98–107)
CO2 SERPL-SCNC: 22 MMOL/L — SIGNIFICANT CHANGE UP (ref 22–31)
CREAT SERPL-MCNC: 0.53 MG/DL — SIGNIFICANT CHANGE UP (ref 0.5–1.3)
EOSINOPHIL # BLD AUTO: 0 K/UL — SIGNIFICANT CHANGE UP (ref 0–0.5)
EOSINOPHIL NFR BLD AUTO: 0 % — SIGNIFICANT CHANGE UP (ref 0–6)
GLUCOSE SERPL-MCNC: 96 MG/DL — SIGNIFICANT CHANGE UP (ref 70–99)
HCT VFR BLD CALC: 33.5 % — LOW (ref 34.5–45)
HGB BLD-MCNC: 10.8 G/DL — LOW (ref 11.5–15.5)
IANC: 5.55 K/UL — SIGNIFICANT CHANGE UP (ref 1.5–8.5)
LYMPHOCYTES # BLD AUTO: 1.25 K/UL — SIGNIFICANT CHANGE UP (ref 1–3.3)
LYMPHOCYTES # BLD AUTO: 14.8 % — SIGNIFICANT CHANGE UP (ref 13–44)
MANUAL SMEAR VERIFICATION: SIGNIFICANT CHANGE UP
MCHC RBC-ENTMCNC: 27.4 PG — SIGNIFICANT CHANGE UP (ref 27–34)
MCHC RBC-ENTMCNC: 32.2 GM/DL — SIGNIFICANT CHANGE UP (ref 32–36)
MCV RBC AUTO: 85 FL — SIGNIFICANT CHANGE UP (ref 80–100)
MONOCYTES # BLD AUTO: 0.73 K/UL — SIGNIFICANT CHANGE UP (ref 0–0.9)
MONOCYTES NFR BLD AUTO: 8.7 % — SIGNIFICANT CHANGE UP (ref 2–14)
NEUTROPHILS # BLD AUTO: 6.09 K/UL — SIGNIFICANT CHANGE UP (ref 1.8–7.4)
NEUTROPHILS NFR BLD AUTO: 71.3 % — SIGNIFICANT CHANGE UP (ref 43–77)
NEUTS BAND # BLD: 0.9 % — SIGNIFICANT CHANGE UP (ref 0–6)
PLAT MORPH BLD: NORMAL — SIGNIFICANT CHANGE UP
PLATELET # BLD AUTO: 207 K/UL — SIGNIFICANT CHANGE UP (ref 150–400)
PLATELET COUNT - ESTIMATE: NORMAL — SIGNIFICANT CHANGE UP
POTASSIUM SERPL-MCNC: 3.9 MMOL/L — SIGNIFICANT CHANGE UP (ref 3.5–5.3)
POTASSIUM SERPL-SCNC: 3.9 MMOL/L — SIGNIFICANT CHANGE UP (ref 3.5–5.3)
PROT SERPL-MCNC: 6.6 G/DL — SIGNIFICANT CHANGE UP (ref 6–8.3)
RBC # BLD: 3.94 M/UL — SIGNIFICANT CHANGE UP (ref 3.8–5.2)
RBC # FLD: 13.2 % — SIGNIFICANT CHANGE UP (ref 10.3–14.5)
RBC BLD AUTO: NORMAL — SIGNIFICANT CHANGE UP
RH IG SCN BLD-IMP: POSITIVE — SIGNIFICANT CHANGE UP
SODIUM SERPL-SCNC: 134 MMOL/L — LOW (ref 135–145)
VARIANT LYMPHS # BLD: 3.5 % — SIGNIFICANT CHANGE UP (ref 0–6)
WBC # BLD: 8.43 K/UL — SIGNIFICANT CHANGE UP (ref 3.8–10.5)
WBC # FLD AUTO: 8.43 K/UL — SIGNIFICANT CHANGE UP (ref 3.8–10.5)

## 2021-02-19 RX ORDER — CITRIC ACID/SODIUM CITRATE 300-500 MG
15 SOLUTION, ORAL ORAL EVERY 6 HOURS
Refills: 0 | Status: DISCONTINUED | OUTPATIENT
Start: 2021-02-19 | End: 2021-02-21

## 2021-02-19 RX ORDER — BUTORPHANOL TARTRATE 2 MG/ML
2 INJECTION, SOLUTION INTRAMUSCULAR; INTRAVENOUS ONCE
Refills: 0 | Status: DISCONTINUED | OUTPATIENT
Start: 2021-02-19 | End: 2021-02-19

## 2021-02-19 RX ORDER — OXYTOCIN 10 UNIT/ML
333.33 VIAL (ML) INJECTION
Qty: 20 | Refills: 0 | Status: DISCONTINUED | OUTPATIENT
Start: 2021-02-19 | End: 2021-02-21

## 2021-02-19 RX ORDER — SODIUM CHLORIDE 9 MG/ML
1000 INJECTION, SOLUTION INTRAVENOUS
Refills: 0 | Status: DISCONTINUED | OUTPATIENT
Start: 2021-02-19 | End: 2021-02-21

## 2021-02-19 RX ORDER — INFLUENZA VIRUS VACCINE 15; 15; 15; 15 UG/.5ML; UG/.5ML; UG/.5ML; UG/.5ML
0.5 SUSPENSION INTRAMUSCULAR ONCE
Refills: 0 | Status: DISCONTINUED | OUTPATIENT
Start: 2021-02-19 | End: 2021-02-22

## 2021-02-19 RX ADMIN — SODIUM CHLORIDE 125 MILLILITER(S): 9 INJECTION, SOLUTION INTRAVENOUS at 20:26

## 2021-02-19 RX ADMIN — BUTORPHANOL TARTRATE 2 MILLIGRAM(S): 2 INJECTION, SOLUTION INTRAMUSCULAR; INTRAVENOUS at 21:59

## 2021-02-19 NOTE — OB RN PATIENT PROFILE - NS PRO DEPRESSION SCREENING Y/N1
Reports left sided chest pain x30 minutes and that radiates to left shoulder, also c/o back pain. States recent cough for past few days. +SOB and bilateral hand numbness.
no

## 2021-02-19 NOTE — OB RN PATIENT PROFILE - NS_CONTRACTPATTER_OBGYN_ALL_OB
Pt with high burden Afib - 48% inspite of being on Amio  Last LV gram from [facility] - 35% (previously 50% from echo)  Recommend aggressive rhythm control strategy - Afib ablation     Dago Lara MD   Cardiac Electrophysiology  [address] [facility]  Office [phone]  [employer]

## 2021-02-19 NOTE — OB PROVIDER H&P - HISTORY OF PRESENT ILLNESS
Patient is a  @ 36 weeks GBS neg EFW 2900 presents for a scheduled IOL for cholestasis of pregnancy. Patient had pruritis in the end of January & was started on Ursodiol. On  she was admitted to the antepartum service with bile acids of 203. Fetal monitoring was normal & patient received BMZ at that time with recommendation by MFM for delivery at 36 weeks. Last bile acids from  were 8.8. Patient denies ctx, lof, vb & endorses +FM. PNC otherwise uncomplicated, accepts blood transfusion    POBHx: denies  GynHx: denies  MedHx: denies  SHx: denies  allergy- benadryl - swelling  meds: ursodiol 300 BID  Social: denies     VS  T(C): --  HR: 100 (21 @ 15:17)  BP: 114/69 (21 @ 15:17)  RR: --  SpO2: --    comfortable  abd soft gravid  130/mod adarsh/+accels/no decels  [place] occ ctx  /-3  vertex     Patient is a  @ 36 weeks GBS neg EFW 2900 presents for a scheduled IOL for cholestasis of pregnancy. Patient had pruritis in the end of January & was started on Ursodiol. On  she was admitted to the antepartum service with bile acids of 203. Fetal monitoring was normal & patient received BMZ at that time with recommendation by MFM for delivery at 36 weeks. Last bile acids from  were 8.8. Patient denies ctx, lof, vb & endorses +FM. PNC otherwise uncomplicated, accepts blood transfusion    POBHx: denies  GynHx: denies  MedHx: denies  SHx: denies  allergy- benadryl - swelling  meds: ursodiol 600 BID  Social: denies     VS  T(C): --  HR: 100 (21 @ 15:17)  BP: 114/69 (21 @ 15:17)  RR: --  SpO2: --    comfortable  abd soft gravid  130/mod adarsh/+accels/no decels  [place] occ ctx  /-3  vertex

## 2021-02-19 NOTE — OB PROVIDER H&P - ASSESSMENT
P0 @ 36 weeks IOL for cholestasis of pregnancy   - admit to L&D  - routine labs  - IV fluids  - PO cytotec for IOL    dw Dr Rae murray

## 2021-02-20 ENCOUNTER — TRANSCRIPTION ENCOUNTER (OUTPATIENT)
Age: 26
End: 2021-02-20

## 2021-02-20 LAB
SARS-COV-2 IGG SERPL QL IA: NEGATIVE — SIGNIFICANT CHANGE UP
SARS-COV-2 IGM SERPL IA-ACNC: 0.07 INDEX — SIGNIFICANT CHANGE UP
T PALLIDUM AB TITR SER: NEGATIVE — SIGNIFICANT CHANGE UP

## 2021-02-20 RX ORDER — URSODIOL 250 MG/1
300 TABLET, FILM COATED ORAL EVERY 8 HOURS
Refills: 0 | Status: DISCONTINUED | OUTPATIENT
Start: 2021-02-20 | End: 2021-02-21

## 2021-02-20 RX ORDER — OXYTOCIN 10 UNIT/ML
2 VIAL (ML) INJECTION
Qty: 30 | Refills: 0 | Status: DISCONTINUED | OUTPATIENT
Start: 2021-02-20 | End: 2021-02-21

## 2021-02-20 RX ADMIN — Medication 2 MILLIUNIT(S)/MIN: at 16:11

## 2021-02-20 RX ADMIN — URSODIOL 300 MILLIGRAM(S): 250 TABLET, FILM COATED ORAL at 18:22

## 2021-02-20 RX ADMIN — SODIUM CHLORIDE 125 MILLILITER(S): 9 INJECTION, SOLUTION INTRAVENOUS at 09:49

## 2021-02-20 RX ADMIN — URSODIOL 300 MILLIGRAM(S): 250 TABLET, FILM COATED ORAL at 10:22

## 2021-02-20 NOTE — CHART NOTE - NSCHARTNOTEFT_GEN_A_CORE
Patient seen and examined at bedside. VE: 10/100/+1, directed to push with contractions. Kenny catheter removed. Will continue pushing with nursing.

## 2021-02-20 NOTE — OB PROVIDER LABOR PROGRESS NOTE - NS_OBIHIFHRDETAILS_OBGYN_ALL_OB_FT
120/mod adarsh/accels+/no decels
125/mod/+acels
130/min-mod/+acels (overall reassuring)
Baseline 125, Moderate Variability, + Accels, - Decels
EFM: 130/mod. variability/+accles/-decels

## 2021-02-21 LAB
APTT BLD: 25.7 SEC — LOW (ref 27–36.3)
BASOPHILS # BLD AUTO: 0.04 K/UL — SIGNIFICANT CHANGE UP (ref 0–0.2)
BASOPHILS NFR BLD AUTO: 0.3 % — SIGNIFICANT CHANGE UP (ref 0–2)
EOSINOPHIL # BLD AUTO: 0.02 K/UL — SIGNIFICANT CHANGE UP (ref 0–0.5)
EOSINOPHIL NFR BLD AUTO: 0.1 % — SIGNIFICANT CHANGE UP (ref 0–6)
FIBRINOGEN PPP-MCNC: 687 MG/DL — HIGH (ref 290–520)
HCT VFR BLD CALC: 27.5 % — LOW (ref 34.5–45)
HCT VFR BLD CALC: 31.3 % — LOW (ref 34.5–45)
HGB BLD-MCNC: 8.8 G/DL — LOW (ref 11.5–15.5)
HGB BLD-MCNC: 9.4 G/DL — LOW (ref 11.5–15.5)
IANC: 10.8 K/UL — HIGH (ref 1.5–8.5)
IMM GRANULOCYTES NFR BLD AUTO: 0.4 % — SIGNIFICANT CHANGE UP (ref 0–1.5)
INR BLD: 1.01 RATIO — SIGNIFICANT CHANGE UP (ref 0.88–1.16)
LYMPHOCYTES # BLD AUTO: 1.51 K/UL — SIGNIFICANT CHANGE UP (ref 1–3.3)
LYMPHOCYTES # BLD AUTO: 11.3 % — LOW (ref 13–44)
MCHC RBC-ENTMCNC: 26.5 PG — LOW (ref 27–34)
MCHC RBC-ENTMCNC: 26.7 PG — LOW (ref 27–34)
MCHC RBC-ENTMCNC: 30 GM/DL — LOW (ref 32–36)
MCHC RBC-ENTMCNC: 32 GM/DL — SIGNIFICANT CHANGE UP (ref 32–36)
MCV RBC AUTO: 83.3 FL — SIGNIFICANT CHANGE UP (ref 80–100)
MCV RBC AUTO: 88.2 FL — SIGNIFICANT CHANGE UP (ref 80–100)
MONOCYTES # BLD AUTO: 0.97 K/UL — HIGH (ref 0–0.9)
MONOCYTES NFR BLD AUTO: 7.2 % — SIGNIFICANT CHANGE UP (ref 2–14)
NEUTROPHILS # BLD AUTO: 10.8 K/UL — HIGH (ref 1.8–7.4)
NEUTROPHILS NFR BLD AUTO: 80.7 % — HIGH (ref 43–77)
NRBC # BLD: 0 /100 WBCS — SIGNIFICANT CHANGE UP
NRBC # BLD: 0 /100 WBCS — SIGNIFICANT CHANGE UP
NRBC # FLD: 0 K/UL — SIGNIFICANT CHANGE UP
NRBC # FLD: 0 K/UL — SIGNIFICANT CHANGE UP
PLATELET # BLD AUTO: 140 K/UL — LOW (ref 150–400)
PLATELET # BLD AUTO: 162 K/UL — SIGNIFICANT CHANGE UP (ref 150–400)
PROTHROM AB SERPL-ACNC: 11.5 SEC — SIGNIFICANT CHANGE UP (ref 10.6–13.6)
RBC # BLD: 3.3 M/UL — LOW (ref 3.8–5.2)
RBC # BLD: 3.55 M/UL — LOW (ref 3.8–5.2)
RBC # FLD: 13.3 % — SIGNIFICANT CHANGE UP (ref 10.3–14.5)
RBC # FLD: 14.4 % — SIGNIFICANT CHANGE UP (ref 10.3–14.5)
WBC # BLD: 13.4 K/UL — HIGH (ref 3.8–10.5)
WBC # BLD: 17.79 K/UL — HIGH (ref 3.8–10.5)
WBC # FLD AUTO: 13.4 K/UL — HIGH (ref 3.8–10.5)
WBC # FLD AUTO: 17.79 K/UL — HIGH (ref 3.8–10.5)

## 2021-02-21 RX ORDER — IBUPROFEN 200 MG
600 TABLET ORAL EVERY 6 HOURS
Refills: 0 | Status: COMPLETED | OUTPATIENT
Start: 2021-02-21 | End: 2022-01-20

## 2021-02-21 RX ORDER — KETOROLAC TROMETHAMINE 30 MG/ML
30 SYRINGE (ML) INJECTION ONCE
Refills: 0 | Status: DISCONTINUED | OUTPATIENT
Start: 2021-02-21 | End: 2021-02-21

## 2021-02-21 RX ORDER — AER TRAVELER 0.5 G/1
1 SOLUTION RECTAL; TOPICAL EVERY 4 HOURS
Refills: 0 | Status: DISCONTINUED | OUTPATIENT
Start: 2021-02-21 | End: 2021-02-22

## 2021-02-21 RX ORDER — SODIUM CHLORIDE 9 MG/ML
1000 INJECTION, SOLUTION INTRAVENOUS
Refills: 0 | Status: DISCONTINUED | OUTPATIENT
Start: 2021-02-21 | End: 2021-02-21

## 2021-02-21 RX ORDER — OXYTOCIN 10 UNIT/ML
333.33 VIAL (ML) INJECTION
Qty: 20 | Refills: 0 | Status: DISCONTINUED | OUTPATIENT
Start: 2021-02-21 | End: 2021-02-21

## 2021-02-21 RX ORDER — TETANUS TOXOID, REDUCED DIPHTHERIA TOXOID AND ACELLULAR PERTUSSIS VACCINE, ADSORBED 5; 2.5; 8; 8; 2.5 [IU]/.5ML; [IU]/.5ML; UG/.5ML; UG/.5ML; UG/.5ML
0.5 SUSPENSION INTRAMUSCULAR ONCE
Refills: 0 | Status: DISCONTINUED | OUTPATIENT
Start: 2021-02-21 | End: 2021-02-22

## 2021-02-21 RX ORDER — MAGNESIUM HYDROXIDE 400 MG/1
30 TABLET, CHEWABLE ORAL
Refills: 0 | Status: DISCONTINUED | OUTPATIENT
Start: 2021-02-21 | End: 2021-02-22

## 2021-02-21 RX ORDER — PRAMOXINE HYDROCHLORIDE 150 MG/15G
1 AEROSOL, FOAM RECTAL EVERY 4 HOURS
Refills: 0 | Status: DISCONTINUED | OUTPATIENT
Start: 2021-02-21 | End: 2021-02-22

## 2021-02-21 RX ORDER — OXYCODONE HYDROCHLORIDE 5 MG/1
5 TABLET ORAL
Refills: 0 | Status: DISCONTINUED | OUTPATIENT
Start: 2021-02-21 | End: 2021-02-22

## 2021-02-21 RX ORDER — ACETAMINOPHEN 500 MG
975 TABLET ORAL
Refills: 0 | Status: DISCONTINUED | OUTPATIENT
Start: 2021-02-21 | End: 2021-02-22

## 2021-02-21 RX ORDER — OXYCODONE HYDROCHLORIDE 5 MG/1
5 TABLET ORAL ONCE
Refills: 0 | Status: DISCONTINUED | OUTPATIENT
Start: 2021-02-21 | End: 2021-02-22

## 2021-02-21 RX ORDER — SENNA PLUS 8.6 MG/1
1 TABLET ORAL
Refills: 0 | Status: DISCONTINUED | OUTPATIENT
Start: 2021-02-21 | End: 2021-02-22

## 2021-02-21 RX ORDER — LANOLIN
1 OINTMENT (GRAM) TOPICAL EVERY 6 HOURS
Refills: 0 | Status: DISCONTINUED | OUTPATIENT
Start: 2021-02-21 | End: 2021-02-22

## 2021-02-21 RX ORDER — HYDROCORTISONE 1 %
1 OINTMENT (GRAM) TOPICAL EVERY 6 HOURS
Refills: 0 | Status: DISCONTINUED | OUTPATIENT
Start: 2021-02-21 | End: 2021-02-22

## 2021-02-21 RX ORDER — TRANEXAMIC ACID 100 MG/ML
1000 INJECTION, SOLUTION INTRAVENOUS ONCE
Refills: 0 | Status: COMPLETED | OUTPATIENT
Start: 2021-02-21 | End: 2021-02-21

## 2021-02-21 RX ORDER — DIBUCAINE 1 %
1 OINTMENT (GRAM) RECTAL EVERY 6 HOURS
Refills: 0 | Status: DISCONTINUED | OUTPATIENT
Start: 2021-02-21 | End: 2021-02-22

## 2021-02-21 RX ORDER — CEFAZOLIN SODIUM 1 G
1000 VIAL (EA) INJECTION ONCE
Refills: 0 | Status: COMPLETED | OUTPATIENT
Start: 2021-02-21 | End: 2021-02-21

## 2021-02-21 RX ORDER — SODIUM CHLORIDE 9 MG/ML
3 INJECTION INTRAMUSCULAR; INTRAVENOUS; SUBCUTANEOUS EVERY 8 HOURS
Refills: 0 | Status: DISCONTINUED | OUTPATIENT
Start: 2021-02-21 | End: 2021-02-22

## 2021-02-21 RX ORDER — SIMETHICONE 80 MG/1
80 TABLET, CHEWABLE ORAL EVERY 4 HOURS
Refills: 0 | Status: DISCONTINUED | OUTPATIENT
Start: 2021-02-21 | End: 2021-02-22

## 2021-02-21 RX ORDER — IBUPROFEN 200 MG
600 TABLET ORAL EVERY 6 HOURS
Refills: 0 | Status: DISCONTINUED | OUTPATIENT
Start: 2021-02-21 | End: 2021-02-22

## 2021-02-21 RX ORDER — BENZOCAINE 10 %
1 GEL (GRAM) MUCOUS MEMBRANE EVERY 6 HOURS
Refills: 0 | Status: DISCONTINUED | OUTPATIENT
Start: 2021-02-21 | End: 2021-02-22

## 2021-02-21 RX ADMIN — Medication 100 MILLIGRAM(S): at 08:04

## 2021-02-21 RX ADMIN — Medication 1000 MILLIUNIT(S)/MIN: at 02:01

## 2021-02-21 RX ADMIN — SENNA PLUS 1 TABLET(S): 8.6 TABLET ORAL at 23:46

## 2021-02-21 RX ADMIN — SODIUM CHLORIDE 3 MILLILITER(S): 9 INJECTION INTRAMUSCULAR; INTRAVENOUS; SUBCUTANEOUS at 14:00

## 2021-02-21 RX ADMIN — SODIUM CHLORIDE 250 MILLILITER(S): 9 INJECTION, SOLUTION INTRAVENOUS at 03:12

## 2021-02-21 RX ADMIN — Medication 600 MILLIGRAM(S): at 17:00

## 2021-02-21 RX ADMIN — Medication 975 MILLIGRAM(S): at 14:00

## 2021-02-21 RX ADMIN — SODIUM CHLORIDE 3 MILLILITER(S): 9 INJECTION INTRAMUSCULAR; INTRAVENOUS; SUBCUTANEOUS at 22:00

## 2021-02-21 RX ADMIN — TRANEXAMIC ACID 220 MILLIGRAM(S): 100 INJECTION, SOLUTION INTRAVENOUS at 00:36

## 2021-02-21 RX ADMIN — OXYCODONE HYDROCHLORIDE 5 MILLIGRAM(S): 5 TABLET ORAL at 23:46

## 2021-02-21 RX ADMIN — Medication 975 MILLIGRAM(S): at 22:39

## 2021-02-21 RX ADMIN — Medication 600 MILLIGRAM(S): at 23:46

## 2021-02-21 RX ADMIN — Medication 600 MILLIGRAM(S): at 08:07

## 2021-02-21 NOTE — DISCHARGE NOTE OB - CARE PROVIDER_API CALL
Mariza Murdock (MD)  Obstetrics and Gynecology Obstetrics and Gynocology  [address] [place] [address]  Phone: [phone]  Fax: [phone]  Follow Up Time:

## 2021-02-21 NOTE — DISCHARGE NOTE OB - CARE PLAN
Principal Discharge DX:	Vaginal delivery  Goal:	postpartum recovery  Assessment and plan of treatment:	routine postpartum care

## 2021-02-21 NOTE — PROGRESS NOTE ADULT - SUBJECTIVE AND OBJECTIVE BOX
Patient is 24yo seen and examined at bedside, no acute overnight events.   No acute concerns, pain well controlled.   Patient is ambulating, voiding spontaneously, passing gas, and tolerating regular diet.  Lochia decreasing.  Denies CP, SOB, leg pain, N/V, HA, blurred vision, epigastric pain.    Vital Signs Last 24 Hours  T(C): 37.4 (02-21-21 @ 01:48), Max: 37.7 (02-20-21 @ 16:13)  HR: 95 (02-21-21 @ 05:27) (65 - 157)  BP: 106/68 (02-21-21 @ 05:18) (91/63 - 114/74)  RR: 16 (02-21-21 @ 02:48) (16 - 18)  SpO2: 98% (02-21-21 @ 05:27) (78% - 100%)    Physical Exam:  General: NAD  Abdomen: Soft, non-tender, non-distended, fundus firm  Pelvic: Lochia wnl  Ext: WWP, non-tender, symmetric, mild edema     Labs:  Blood type: AB Positive  Rubella IgG: RPR: Negative                          9.4<L>   13.40<H> >-----------< 162    ( 02-21 @ 02:04 )             31.3<L>                        10.8<L>   8.43 >-----------< 207    ( 02-19 @ 15:45 )             33.5<L>    02-19-21 @ 15:47      134<L>  |  102  |  6<L>  ----------------------------<  96  3.9   |  22  |  0.53        Ca    9.0      19 Feb 2021 15:47    TPro  6.6  /  Alb  3.4  /  TBili  0.4  /  DBili  x   /  AST  17  /  ALT  15  /  AlkPhos  233<H>  02-19-21 @ 15:47              MEDICATIONS  (STANDING):  acetaminophen   Tablet .. 975 milliGRAM(s) Oral <User Schedule>  citric acid/sodium citrate Solution 15 milliLiter(s) Oral every 6 hours  diphtheria/tetanus/pertussis (acellular) Vaccine (ADAcel) 0.5 milliLiter(s) IntraMuscular once  ibuprofen  Tablet. 600 milliGRAM(s) Oral every 6 hours  influenza   Vaccine 0.5 milliLiter(s) IntraMuscular once  ketorolac   Injectable 30 milliGRAM(s) IV Push once  lactated ringers. 1000 milliLiter(s) (250 mL/Hr) IV Continuous <Continuous>  oxytocin Infusion 333.333 milliUNIT(s)/Min (1000 mL/Hr) IV Continuous <Continuous>  oxytocin Infusion 2 milliUNIT(s)/Min (2 mL/Hr) IV Continuous <Continuous>  oxytocin Infusion 333.333 milliUNIT(s)/Min (1000 mL/Hr) IV Continuous <Continuous>  prenatal multivitamin 1 Tablet(s) Oral daily  sodium chloride 0.9% lock flush 3 milliLiter(s) IV Push every 8 hours  ursodiol Capsule 300 milliGRAM(s) Oral every 8 hours    MEDICATIONS  (PRN):  benzocaine 20%/menthol 0.5% Spray 1 Spray(s) Topical every 6 hours PRN for Perineal discomfort  dibucaine 1% Ointment 1 Application(s) Topical every 6 hours PRN Perineal discomfort  hydrocortisone 1% Cream 1 Application(s) Topical every 6 hours PRN Moderate Pain (4-6)  lanolin Ointment 1 Application(s) Topical every 6 hours PRN nipple soreness  magnesium hydroxide Suspension 30 milliLiter(s) Oral two times a day PRN Constipation  oxyCODONE    IR 5 milliGRAM(s) Oral every 3 hours PRN Moderate to Severe Pain (4-10)  oxyCODONE    IR 5 milliGRAM(s) Oral once PRN Moderate to Severe Pain (4-10)  pramoxine 1%/zinc 5% Cream 1 Application(s) Topical every 4 hours PRN Moderate Pain (4-6)  simethicone 80 milliGRAM(s) Chew every 4 hours PRN Gas  witch hazel Pads 1 Application(s) Topical every 4 hours PRN Perineal discomfort      26y/o G_P_ PPD#_ from _ in stable condition. PMH significant for _. Current issues include _    - Continue with po analgesia  - Increase ambulation  - Continue regular diet  - IV lock  - No labs Patient is 24yo seen and examined at bedside, delivered late last night into AM.   No acute concerns, pain well controlled. Pt denies dizziness, syncope or palpitations.   Patient is not yet ambulating or passing gas. Tolerating clear diet.  Denies CP, SOB, leg pain, N/V, HA, blurred vision, epigastric pain.    Vital Signs Last 24 Hours  T(C): 37.4 (02-21-21 @ 01:48), Max: 37.7 (02-20-21 @ 16:13)  HR: 95 (02-21-21 @ 05:27) (65 - 157)  BP: 106/68 (02-21-21 @ 05:18) (91/63 - 114/74)  RR: 16 (02-21-21 @ 02:48) (16 - 18)  SpO2: 98% (02-21-21 @ 05:27) (78% - 100%)    Physical Exam:  General: NAD  Abdomen: Soft, non-tender, non-distended, fundus firm  Pelvic: Lochia wnl, vaginal packing in place  Ext: WWP, non-tender, symmetric, mild edema     Labs:  Blood type: AB Positive  Rubella IgG: RPR: Negative                          9.4<L>   13.40<H> >-----------< 162    ( 02-21 @ 02:04 )             31.3<L>                        10.8<L>   8.43 >-----------< 207    ( 02-19 @ 15:45 )             33.5<L>    02-19-21 @ 15:47      134<L>  |  102  |  6<L>  ----------------------------<  96  3.9   |  22  |  0.53        Ca    9.0      19 Feb 2021 15:47    TPro  6.6  /  Alb  3.4  /  TBili  0.4  /  DBili  x   /  AST  17  /  ALT  15  /  AlkPhos  233<H>  02-19-21 @ 15:47              MEDICATIONS  (STANDING):  acetaminophen   Tablet .. 975 milliGRAM(s) Oral <User Schedule>  citric acid/sodium citrate Solution 15 milliLiter(s) Oral every 6 hours  diphtheria/tetanus/pertussis (acellular) Vaccine (ADAcel) 0.5 milliLiter(s) IntraMuscular once  ibuprofen  Tablet. 600 milliGRAM(s) Oral every 6 hours  influenza   Vaccine 0.5 milliLiter(s) IntraMuscular once  ketorolac   Injectable 30 milliGRAM(s) IV Push once  lactated ringers. 1000 milliLiter(s) (250 mL/Hr) IV Continuous <Continuous>  oxytocin Infusion 333.333 milliUNIT(s)/Min (1000 mL/Hr) IV Continuous <Continuous>  oxytocin Infusion 2 milliUNIT(s)/Min (2 mL/Hr) IV Continuous <Continuous>  oxytocin Infusion 333.333 milliUNIT(s)/Min (1000 mL/Hr) IV Continuous <Continuous>  prenatal multivitamin 1 Tablet(s) Oral daily  sodium chloride 0.9% lock flush 3 milliLiter(s) IV Push every 8 hours  ursodiol Capsule 300 milliGRAM(s) Oral every 8 hours    MEDICATIONS  (PRN):  benzocaine 20%/menthol 0.5% Spray 1 Spray(s) Topical every 6 hours PRN for Perineal discomfort  dibucaine 1% Ointment 1 Application(s) Topical every 6 hours PRN Perineal discomfort  hydrocortisone 1% Cream 1 Application(s) Topical every 6 hours PRN Moderate Pain (4-6)  lanolin Ointment 1 Application(s) Topical every 6 hours PRN nipple soreness  magnesium hydroxide Suspension 30 milliLiter(s) Oral two times a day PRN Constipation  oxyCODONE    IR 5 milliGRAM(s) Oral every 3 hours PRN Moderate to Severe Pain (4-10)  oxyCODONE    IR 5 milliGRAM(s) Oral once PRN Moderate to Severe Pain (4-10)  pramoxine 1%/zinc 5% Cream 1 Application(s) Topical every 4 hours PRN Moderate Pain (4-6)  simethicone 80 milliGRAM(s) Chew every 4 hours PRN Gas  witch hazel Pads 1 Application(s) Topical every 4 hours PRN Perineal discomfort

## 2021-02-21 NOTE — PROGRESS NOTE ADULT - ATTENDING COMMENTS
s/p 1 prbc - f/u post transfusion cbc   asymptomatic despite orthostatic pressures   continue to monitor   keep  vaginal packing/ shannon x 24 hrs ; no bleeding beyond vaginal packing   continue to monitor

## 2021-02-21 NOTE — DISCHARGE NOTE OB - MEDICATION SUMMARY - MEDICATIONS TO STOP TAKING
I will STOP taking the medications listed below when I get home from the hospital:    ursodiol 300 mg oral capsule  -- 2 cap(s) by mouth every 12 hours

## 2021-02-21 NOTE — OB PROVIDER DELIVERY SUMMARY - NSPROVIDERDELIVERYNOTE_OBGYN_ALL_OB_FT
Patient noted to be fully dilated, +1 station. Directed to push with contractions. Delivered a viable female  with cord around the foot. Cord clamped and cut after 60 seconds. Placenta delivered spontaneously and intact. A 3rd degree laceration was noted and repaired in the usual fashion with vicryl. Bilateral labial lacerations were repaired with 2-0 chromic. A right sulcal laceration was repaired with 2-0 chromic. Due to continued bleeding from lacerations, TXA x1 dose given, 1L IV bolus started, 2nd IV line placed, stat CBC/Coags sent and she was crossmatched for 2 units pRBCs to be kept on hold. A shannon catheter was placed. When continued bleeding was noted safety office Dr. Wisdom was called into the room for assistance. The sites of bleeding were able to be sutured and the bleeding was under control. Once repair of lacerations were completed vaginal packing x1 was placed in the vagina. Hemostasis was noted. EBL 800cc.

## 2021-02-21 NOTE — OB RN DELIVERY SUMMARY - NS_SEPSISRSKCALC_OBGYN_ALL_OB_FT
EOS calculated successfully. EOS Risk Factor: 0.5/1000 live births ([facility] national incidence); GA=36w1d; Temp=99.86; ROM=2.8; GBS='Negative'; Antibiotics='No antibiotics or any antibiotics < 2 hrs prior to birth'

## 2021-02-21 NOTE — PROVIDER CONTACT NOTE (OTHER) - ASSESSMENT
laying 111/63  shock index 0.9 sitting 115/62  shock index 1.1 standing 110/58  shock index 1.3. pt reports feeling "heart racing" denies lightheadedness. adequate urinary output. fundus firm @ u light lochia

## 2021-02-21 NOTE — PROVIDER CONTACT NOTE (OTHER) - BACKGROUND
sp  at 2356 on  EBL 800cc 3rd degree lac with vag packing, shannon in place. sp 1 unit prbc and LR @ 250 x5 hrs.

## 2021-02-21 NOTE — DISCHARGE NOTE OB - PATIENT PORTAL LINK FT
You can access the FollowMyHealth Patient Portal offered by [facility] by registering at the following website: [email or web address]. By joining [facility]’s FollowMyHealth portal, you will also be able to view your health information using other applications (apps) compatible with our system.

## 2021-02-21 NOTE — CHART NOTE - NSCHARTNOTEFT_GEN_A_CORE
R1 Chart Note     Patient has mildly positive orthostatics by heart rate is asymptomatic, urine output is adequate, s/p LR @ 250.    Patient is cleared to go to the postpartum floor       Shanti Prince, PGY-1

## 2021-02-22 VITALS
TEMPERATURE: 98 F | DIASTOLIC BLOOD PRESSURE: 72 MMHG | SYSTOLIC BLOOD PRESSURE: 106 MMHG | HEART RATE: 98 BPM | RESPIRATION RATE: 18 BRPM | OXYGEN SATURATION: 100 %

## 2021-02-22 LAB
HCT VFR BLD CALC: 26.8 % — LOW (ref 34.5–45)
HGB BLD-MCNC: 8.5 G/DL — LOW (ref 11.5–15.5)
MCHC RBC-ENTMCNC: 26.9 PG — LOW (ref 27–34)
MCHC RBC-ENTMCNC: 31.7 GM/DL — LOW (ref 32–36)
MCV RBC AUTO: 84.8 FL — SIGNIFICANT CHANGE UP (ref 80–100)
NRBC # BLD: 0 /100 WBCS — SIGNIFICANT CHANGE UP
NRBC # FLD: 0 K/UL — SIGNIFICANT CHANGE UP
PLATELET # BLD AUTO: 146 K/UL — LOW (ref 150–400)
RBC # BLD: 3.16 M/UL — LOW (ref 3.8–5.2)
RBC # FLD: 15 % — HIGH (ref 10.3–14.5)
WBC # BLD: 13.84 K/UL — HIGH (ref 3.8–10.5)
WBC # FLD AUTO: 13.84 K/UL — HIGH (ref 3.8–10.5)

## 2021-02-22 RX ORDER — FERROUS SULFATE 325(65) MG
325 TABLET ORAL THREE TIMES A DAY
Refills: 0 | Status: DISCONTINUED | OUTPATIENT
Start: 2021-02-22 | End: 2021-02-22

## 2021-02-22 RX ORDER — ASCORBIC ACID 60 MG
500 TABLET,CHEWABLE ORAL DAILY
Refills: 0 | Status: DISCONTINUED | OUTPATIENT
Start: 2021-02-22 | End: 2021-02-22

## 2021-02-22 RX ADMIN — Medication 600 MILLIGRAM(S): at 17:27

## 2021-02-22 RX ADMIN — Medication 975 MILLIGRAM(S): at 15:27

## 2021-02-22 RX ADMIN — SENNA PLUS 1 TABLET(S): 8.6 TABLET ORAL at 10:00

## 2021-02-22 RX ADMIN — Medication 600 MILLIGRAM(S): at 04:59

## 2021-02-22 RX ADMIN — Medication 600 MILLIGRAM(S): at 11:27

## 2021-02-22 RX ADMIN — SODIUM CHLORIDE 3 MILLILITER(S): 9 INJECTION INTRAMUSCULAR; INTRAVENOUS; SUBCUTANEOUS at 17:28

## 2021-02-22 RX ADMIN — Medication 325 MILLIGRAM(S): at 04:58

## 2021-02-22 RX ADMIN — SODIUM CHLORIDE 3 MILLILITER(S): 9 INJECTION INTRAMUSCULAR; INTRAVENOUS; SUBCUTANEOUS at 04:59

## 2021-02-22 NOTE — CHART NOTE - NSCHARTNOTEFT_GEN_A_CORE
*NOTE DELAYED 2/2 PATIENT CARE. PATIENT SEEN AND EXAMINED AT 11PM ON 2/21/2021*    Patient seen and examined at bedside. Vaginal packing removed without incidence.   Nursing to removed Kenny catheter.    MARIIA Love, PGY-1

## 2021-02-22 NOTE — PROGRESS NOTE ADULT - PROBLEM SELECTOR PLAN 1
- Continue with IV/po analgesia  - Increase ambulation  - Advance diet as tolerated   - F/u 4 hour post-transfusion CBC  - Vaginal packing and Kenny x24 hours postpartum    Adilia Lu, PGY-1
- Continue with po analgesia, pain well controlled  - Increase ambulation, SCDs when not ambulating  - Continue regular diet  - Hct stable this morning at 26.8. No evidence of ongoing bleeding    Cyndee Marquez, PGY1

## 2021-02-22 NOTE — PROGRESS NOTE ADULT - SUBJECTIVE AND OBJECTIVE BOX
INTERVAL HPI/OVERNIGHT EVENTS: Pt seen and examined at bedside.  Doing well, denies complaints.  +ambulation, kenroy reg diet. denies heavy lochia, shannno and vaginal packing removed at 24 hrs     MEDICATIONS  (STANDING):  acetaminophen   Tablet .. 975 milliGRAM(s) Oral <User Schedule>  ascorbic acid 500 milliGRAM(s) Oral daily  diphtheria/tetanus/pertussis (acellular) Vaccine (ADAcel) 0.5 milliLiter(s) IntraMuscular once  ferrous    sulfate 325 milliGRAM(s) Oral three times a day  ibuprofen  Tablet. 600 milliGRAM(s) Oral every 6 hours  influenza   Vaccine 0.5 milliLiter(s) IntraMuscular once  prenatal multivitamin 1 Tablet(s) Oral daily  senna 1 Tablet(s) Oral two times a day  sodium chloride 0.9% lock flush 3 milliLiter(s) IV Push every 8 hours    MEDICATIONS  (PRN):  benzocaine 20%/menthol 0.5% Spray 1 Spray(s) Topical every 6 hours PRN for Perineal discomfort  dibucaine 1% Ointment 1 Application(s) Topical every 6 hours PRN Perineal discomfort  hydrocortisone 1% Cream 1 Application(s) Topical every 6 hours PRN Moderate Pain (4-6)  lanolin Ointment 1 Application(s) Topical every 6 hours PRN nipple soreness  magnesium hydroxide Suspension 30 milliLiter(s) Oral two times a day PRN Constipation  oxyCODONE    IR 5 milliGRAM(s) Oral every 3 hours PRN Moderate to Severe Pain (4-10)  oxyCODONE    IR 5 milliGRAM(s) Oral once PRN Moderate to Severe Pain (4-10)  pramoxine 1%/zinc 5% Cream 1 Application(s) Topical every 4 hours PRN Moderate Pain (4-6)  simethicone 80 milliGRAM(s) Chew every 4 hours PRN Gas  witch hazel Pads 1 Application(s) Topical every 4 hours PRN Perineal discomfort      Vital Signs Last 24 Hrs  T(C): 36.8 (21 Feb 2021 22:32), Max: 36.8 (21 Feb 2021 10:05)  T(F): 98.2 (21 Feb 2021 22:32), Max: 98.2 (21 Feb 2021 10:05)  HR: 85 (21 Feb 2021 22:32) (70 - 139)  BP: 115/68 (21 Feb 2021 22:32) (99/55 - 115/68)  BP(mean): --  RR: 18 (21 Feb 2021 22:32) (18 - 18)  SpO2: 100% (21 Feb 2021 22:32) (97% - 100%)    PHYSICAL EXAM:    GA: NAD, A+0 x 3  Abd: ( + ) BS, soft, nontender, nondistended, no rebound or guarding,   Uterus: Fundus midline; firm    LABS:                        8.8    17.79 )-----------( 140      ( 21 Feb 2021 13:13 )             27.5           PT/INR - ( 21 Feb 2021 02:04 )   PT: 11.5 sec;   INR: 1.01 ratio         PTT - ( 21 Feb 2021 02:04 )  PTT:25.7 sec        RADIOLOGY & ADDITIONAL TESTS:

## 2021-02-22 NOTE — PROGRESS NOTE ADULT - ASSESSMENT
24y/o PPD#2 from  c/b ICP and  s/p vaginal packing and 1U pRBC in stable condition. Pain is well controlled and pt is doing well.      
ppd 1 s/p vaginal packing/shannon removal at 24 hrs, tachycardia resolved, recovering appropriately   [] continue routine postpartum care   [] encourage ambulation   [] continue pain management PRN   [] f/u am labs   [] discharge planning 
26y/o  PPD#1 from  c/b 3rd degree, r sulcal r periurethral and bilateral labial lacerations (EBL=800mL) s/p TXA, now receiving 1U pRBC given EBL in stable condition.

## 2021-02-22 NOTE — PROGRESS NOTE ADULT - SUBJECTIVE AND OBJECTIVE BOX
OB Progress Note:  PPD#2    S: 24yo PPD#2 s/p . Patient feels well. Pain is well controlled. She is tolerating a regular diet and passing flatus. She is voiding spontaneously, and ambulating without difficulty. Endorses light vaginal bleeding, soaking less than 1 pad/hour. Denies CP/SOB. Denies lightheadedness/dizziness. Denies N/V.    O:  Vitals:   Vital Signs Last 24 Hrs  T(C): 36.4 (2021 06:20), Max: 36.8 (2021 10:05)  T(F): 97.6 (2021 06:20), Max: 98.2 (2021 10:05)  HR: 77 (2021 06:20) (70 - 139)  BP: 105/72 (2021 06:20) (99/55 - 115/68)  BP(mean): --  RR: 17 (2021 06:20) (17 - 18)  SpO2: 99% (2021 06:20) (98% - 100%)    MEDICATIONS  (STANDING):  acetaminophen   Tablet .. 975 milliGRAM(s) Oral <User Schedule>  ascorbic acid 500 milliGRAM(s) Oral daily  diphtheria/tetanus/pertussis (acellular) Vaccine (ADAcel) 0.5 milliLiter(s) IntraMuscular once  ferrous    sulfate 325 milliGRAM(s) Oral three times a day  ibuprofen  Tablet. 600 milliGRAM(s) Oral every 6 hours  influenza   Vaccine 0.5 milliLiter(s) IntraMuscular once  prenatal multivitamin 1 Tablet(s) Oral daily  senna 1 Tablet(s) Oral two times a day  sodium chloride 0.9% lock flush 3 milliLiter(s) IV Push every 8 hours    MEDICATIONS  (PRN):  benzocaine 20%/menthol 0.5% Spray 1 Spray(s) Topical every 6 hours PRN for Perineal discomfort  dibucaine 1% Ointment 1 Application(s) Topical every 6 hours PRN Perineal discomfort  hydrocortisone 1% Cream 1 Application(s) Topical every 6 hours PRN Moderate Pain (4-6)  lanolin Ointment 1 Application(s) Topical every 6 hours PRN nipple soreness  magnesium hydroxide Suspension 30 milliLiter(s) Oral two times a day PRN Constipation  oxyCODONE    IR 5 milliGRAM(s) Oral every 3 hours PRN Moderate to Severe Pain (4-10)  oxyCODONE    IR 5 milliGRAM(s) Oral once PRN Moderate to Severe Pain (4-10)  pramoxine 1%/zinc 5% Cream 1 Application(s) Topical every 4 hours PRN Moderate Pain (4-6)  simethicone 80 milliGRAM(s) Chew every 4 hours PRN Gas  witch hazel Pads 1 Application(s) Topical every 4 hours PRN Perineal discomfort      Labs:  Blood type: AB Positive  Rubella IgG: RPR: Negative                          8.5<L>   13.84<H> >-----------< 146<L>    (  @ 06:57 )             26.8<L>                        8.8<L>   17.79<H> >-----------< 140<L>    (  @ 13:13 )             27.5<L>                        9.4<L>   13.40<H> >-----------< 162    (  @ 02:04 )             31.3<L>                        10.8<L>   8.43 >-----------< 207    (  @ 15:45 )             33.5<L>    21 @ 15:47      134<L>  |  102  |  6<L>  ----------------------------<  96  3.9   |  22  |  0.53        Ca    9.0      2021 15:47    TPro  6.6  /  Alb  3.4  /  TBili  0.4  /  DBili  x   /  AST  17  /  ALT  15  /  AlkPhos  233<H>  21 @ 15:47          Physical Exam:  General: NAD  Heart: extremities well-perfused  Lungs: breathing comfortably  Abdomen: soft, non-tender, non-distended, fundus firm  Vaginal: lochia wnl  Extremities: No calf tenderness or erythema

## 2021-03-01 DIAGNOSIS — O26.613 LIVER AND BILIARY TRACT DISORDERS IN PREGNANCY, THIRD TRIMESTER: ICD-10-CM

## 2021-03-01 DIAGNOSIS — Z3A.35 35 WEEKS GESTATION OF PREGNANCY: ICD-10-CM

## 2021-03-01 DIAGNOSIS — O34.93 MATERNAL CARE FOR ABNORMALITY OF PELVIC ORGAN, UNSPECIFIED, THIRD TRIMESTER: ICD-10-CM

## 2021-03-31 DIAGNOSIS — Z3A.34 34 WEEKS GESTATION OF PREGNANCY: ICD-10-CM

## 2021-03-31 DIAGNOSIS — O26.613 LIVER AND BILIARY TRACT DISORDERS IN PREGNANCY, THIRD TRIMESTER: ICD-10-CM

## 2021-03-31 DIAGNOSIS — O34.93 MATERNAL CARE FOR ABNORMALITY OF PELVIC ORGAN, UNSPECIFIED, THIRD TRIMESTER: ICD-10-CM

## 2021-08-23 NOTE — ED PROVIDER NOTE - TEMPLATE, MLM
Orthopedic
54 yo male , pmh-htn, hld, sleep apnea in the past c/o right hip pain secondary to osteoarthritis - scheduled for right hip arthroplasty  goal: to walk without pain  denies recent travels in the past 30 days. No fever, SOB, cough, flu like symptoms or body rash- covid screen  covid vaccine completed

## 2021-08-24 NOTE — ED PROVIDER NOTE - NSTIMEPROVIDERCAREINITIATE_GEN_ER
Is This A New Presentation, Or A Follow-Up?: Skin Lesion What Type Of Note Output Would You Prefer (Optional)?: Standard Output How Severe Is Your Skin Lesion?: mild Has Your Skin Lesion Been Treated?: not been treated 15-Aug-2017 15:50

## 2022-02-07 NOTE — OB PROVIDER TRIAGE NOTE - NSRISKFACTORS_OBGYN_ALL_OB
[facility] INPATIENT ENCOUNTER  CRITICAL CARE DAILY PROGRESS NOTE    ADMISSION DATE:  1/12/2022  DATE:  2/7/2022  CURRENT HOSPITAL DAY:  Hospital Day: 27  ATTENDING PHYSICIAN:  Janey Bryant MD  CODE STATUS:  Full Resuscitation    IMPRESSION:   1. COVID 19  2. Acute hypoxic respiratory failure,  intubated 1/22  3. CORONA - resolved  4. Serratia Bronchitis/pneumonia - completed 10d treatment 2.3.22  5. Fevers     Actemra  Prone 1/23-1/25, 1/26-1/28.     PLAN:   1. 65% PRVC. Check ABG. High MV requirement and decreased compliance  2. Cont Flolan  3. Sedated with Versed, Fentanyl and Ketamine.  Seroquel 100 mg BID  4. Failed weaning paralytic with guppy breathing and increased oxygen requirements.  5. Schedule lasix daily   6. ID following  7. Decadron 4 mg daily, day 1  8. Tolerating TF  9. Palliative following on going discussion for DNR status.   Overall poor prognosis; no improvement in overall situation in the last several days.    Lovenox 40 q 24  H2 blocker    Discussed with RN, RT, Dr. Bryant and Dr. Richards    Family meeting scheduled for 13:00 today.       Chief Complaint: SOB    SUBJECTIVE:  Sedated and paralyzed on the vent.     MEDICATIONS:  SCHEDULED MEDS:  • thiamine  100 mg OG Tube Daily   • dexamethasone  4 mg OG Tube Daily   • famotidine  20 mg OG Tube 2 times per day   • insulin glargine  26 Units Subcutaneous 2 times per day   • QUEtiapine  100 mg Per NG tube 2 times per day   • ipratropium-albuterol  3 mL Nebulization 4x Daily Resp   • enoxaparin  40 mg Subcutaneous 2 times per day   • rocuronium  1 mg/kg (Dosing Weight) Intravenous Once   • insulin regular (human)   Subcutaneous 4 times per day   • chlorhexidine gluconate  15 mL Swish & Spit 2 times per day   • ophthalmic gel  1 application Both Eyes 6 times per day   • acyclovir  400 mg OG Tube Q12H   • ascorbic acid  1,000 mg OG Tube BID   • loratadine  10 mg OG Tube QAM AC   • polyethylene glycol  17 g OG Tube Daily   • zinc sulfate  220 mg OG Tube  Daily with breakfast   • sodium chloride (PF)  10 mL Injection 2 times per day   • sodium chloride (PF)  2 mL Intracatheter 2 times per day   • Potassium Standard Replacement Protocol   Does not apply See Admin Instructions   • Magnesium Standard Replacement Protocol   Does not apply See Admin Instructions       CONTINUOUS INFUSIONS:  • ketamine (KETALAR) 2000 mg/250 mL in sodium chloride 0.9 % infusion 1.5 mg/kg/hr (02/07/22 0759)   • rocuronium 500 mg/100 mL in sodium chloride 0.9 % infusion 11 mcg/kg/min (02/07/22 1018)   • MIDAZolam (VERSED) 100 mg/100 mL in saline infusion 11 mg/hr (02/07/22 1028)   • NORepinephrine (LEVOPHED) 8 mg/250 mL in sodium chloride 0.9 % infusion Stopped (02/06/22 1410)   • epoprostenol 1.5 mg in sterile water (preservative free) 50 mL inhalation solution 50 ng/kg/min (02/07/22 0810)   • fentaNYL (SUBLIMAZE) 2,500 mcg/250 mL in sodium chloride 0.9 % infusion 200 mcg/hr (02/07/22 1028)   • sodium chloride 0.9% infusion     • sodium chloride 0.9% infusion         PRN MEDS:  rocuronium, fentaNYL, MIDAZolam, dextrose, dextrose, glucagon, dextrose, dextrose, chlorhexidine gluconate **AND** chlorhexidine gluconate, LORazepam, MIDAZolam, senna, benzonatate, sodium chloride (PF), sodium chloride (PF), sodium chloride, sodium chloride, sodium chloride, sodium chloride, sodium chloride, ondansetron **OR** ondansetron    HISTORIES:  I have reviewed the past medical history, family history, social history, medications and allergies listed in the medical record as well as the nursing notes for this encounter.    OBJECTIVE:  VITAL SIGNS:   Vital Last Value 24 Hour Range   Temperature 97.9 °F (36.6 °C) (02/07/22 0800) Temp  Min: 97.9 °F (36.6 °C)  Max: 98.9 °F (37.2 °C)   Pulse 82 (02/07/22 1030) Pulse  Min: 77  Max: 107   Respiratory (!) 30 (02/07/22 1030) Resp  Min: 27  Max: 30   Non-Invasive  Blood Pressure 105/60 (02/07/22 1000) BP  Min: 93/58  Max: 122/70   Pulse Oximetry 92 % (02/07/22 1030)  SpO2  Min: 89 %  Max: 97 %     Vital Today Admitted   Weight 112.1 kg (247 lb 2.2 oz) (02/07/22 0452) Weight: 104.3 kg (230 lb) (01/12/22 1302)   Height N/A Height: 6' (182.9 cm) (01/12/22 1302)   BMI N/A BMI (Calculated): 31.19 (01/12/22 1302)       VENT SETTINGS LAST 24 HOURS:  FiO2 (%):  [65 %-80 %] 65 %  S RR:  [28] 28  PEEP/CPAP/EPAP:  [12 cm H20] 12 cm H20  On pressure control with driving pressure 28 cm.     PHYSICAL EXAM:  General: Sedated on the vent  Skin:  Skin color, texture, and turgor normal.  No rashes or lesions.  Head:  Normocephalic, without obvious abnormality, atraumatic.  Eyes:  PERRL, conjunctivae/corneas clear.  Nose: Nares normal. Septum midline.  Throat:  Lips, mucosa, and tongue normal; teeth and gums normal. +ETT  Neck: Supple, symmetrical.  Trachea midline.   Lungs:  Diminished, no wheeze or rales  Heart: Regular rate and rhythm. S1 and S2 normal. No murmur, rub or gallop.  Abdomen: Soft, nt, nd, no masses palpated, diminished bowel sounds  Extremities:  Normal, atraumatic, no cyanosis, non pitting hand.  No lower extremity or dependent edema noted. Right toes amputated   Neuro:sedated and paralyzed    LABORATORY DATA:  Recent Labs   Lab 02/07/22 0518 02/06/22 0449 02/05/22  0900   SODIUM 141 140 141   POTASSIUM 4.1 4.2 4.1   CHLORIDE 105 102 104   CO2 34* 35* 32   ANIONGAP 6* 7* 9*   GLUCOSE 192* 144* 193*   BUN 34* 36* 34*   CREATININE 0.62* 0.62* 0.71   BCRAT 55* 58* 48*   CALCIUM 8.7 8.0* 8.0*     Recent Labs   Lab 02/07/22 0518 02/06/22 0449 02/05/22  0900 02/04/22  0454   WBC 11.3* 12.9* 11.5* 12.2*   HCT 31.0* 33.5* 31.2* 30.7*   HGB 9.3* 10.2* 9.5* 9.5*     --  221 184     Recent Labs   Lab 02/03/22  0406 02/02/22  1328 02/02/22  0727   APH 7.50* 7.35 7.35   APO2 82* 74* 75*   FIO2 70 80 100   AHCO3 35* 34* 36*   APCO2 44 64* 66*     Recent Labs   Lab 02/06/22  0449 02/04/22  0454 02/02/22  0518   FERR 1,167* 846* 457*   CRP 12.0* 16.0* 10.0*   * 345* 359*   CPK  44 88 23*   DDIMER 4.71* 4.36* 10.76*   Ferritin continues to increase.  CRP peaked again and is now decreasing.      IMAGING STUDIES:    Radiographic images have been reviewed by me personally.    XR CHEST AP OR PA 2/7  Patient's endotracheal tube and nasogastric tube are in stable and  satisfactory.     There is no large effusion or pneumothorax. The heart size is stable. There  is stable, generally symmetric bilateral patchy airspace infiltrates and  groundglass opacities            On 02/07/22, IMarysol NP scribed the services personally performed by GRUPO Granda MD    The documentation recorded by the scribe accurately and completely reflects the service(s) I personally performed and the decisions made by me.     Gary Granda MD    The patient is critically ill with multiple medical problems and requires high complexity decision making for assessment and support including frequent evaluation and titration of therapies and assessment and treatment of complex physiologic processes. Critical Care time: 39 min (>50% of time spent in counseling and coordination of care)       No

## 2022-03-17 ENCOUNTER — EMERGENCY (EMERGENCY)
Facility: HOSPITAL | Age: 27
LOS: 1 days | Discharge: HOME | End: 2022-03-17
Admitting: STUDENT IN AN ORGANIZED HEALTH CARE EDUCATION/TRAINING PROGRAM
Payer: COMMERCIAL

## 2022-03-17 VITALS
SYSTOLIC BLOOD PRESSURE: 112 MMHG | HEART RATE: 74 BPM | TEMPERATURE: 98 F | DIASTOLIC BLOOD PRESSURE: 68 MMHG | HEIGHT: 62 IN | OXYGEN SATURATION: 98 % | RESPIRATION RATE: 16 BRPM

## 2022-03-17 PROCEDURE — 99283 EMERGENCY DEPT VISIT LOW MDM: CPT

## 2022-03-17 RX ORDER — OFLOXACIN 0.3 %
1 DROPS OPHTHALMIC (EYE)
Refills: 0 | Status: DISCONTINUED | OUTPATIENT
Start: 2022-03-17 | End: 2022-03-21

## 2022-03-17 RX ADMIN — Medication 1 DROP(S): at 23:36

## 2022-03-17 NOTE — ED PROVIDER NOTE - OBJECTIVE STATEMENT
26 y/o F p/w R eye pain x 2 days. Pt reports she was lying down with contact lenses in when daughter mistakenly kicked her in the eye. Pt reports she took out contact lens but has been having pain since. Pt reports light sensitivity. No change in vision. Feels like something is in eye. No other complaints.

## 2022-03-17 NOTE — ED ADULT NURSE NOTE - NSIMPLEMENTINTERV_GEN_ALL_ED
Implemented All Universal Safety Interventions:  [place] to call system. Call bell, personal items and telephone within reach. Instruct patient to call for assistance. Room bathroom lighting operational. Non-slip footwear when patient is off stretcher. Physically safe environment: no spills, clutter or unnecessary equipment. Stretcher in lowest position, wheels locked, appropriate side rails in place.

## 2022-03-17 NOTE — ED PROVIDER NOTE - NSFOLLOWUPINSTRUCTIONS_ED_ALL_ED_FT
Corneal Abrasion    The cornea is the clear covering at the front and center of the eye. This very thin tissue is made up of many layers. If a scratch or injury causes the corneal epithelium to come off, it is called a corneal abrasion. Symptoms include eye pain, redness, tearing, difficulty keeping eye open, and light sensitivity. Do not drive or operate machinery if your eye is patched.  Antibiotic eye drops may be prescribed to reduce the risk of infection.  It is important to follow up with an ophthalmologist (eye doctor) to ensure proper healing.    SEEK IMMEDIATE MEDICAL CARE IF YOU HAVE ANY OF THE FOLLOWING SYMPTOMS: discharge from eyes, changes in vision, fever, or swelling. Administer 1-2 drops into R eye four times a day for 5 days.   Follow up with Ophthalmology clinic. Call to schedule an appointment     Corneal Abrasion    The cornea is the clear covering at the front and center of the eye. This very thin tissue is made up of many layers. If a scratch or injury causes the corneal epithelium to come off, it is called a corneal abrasion. Symptoms include eye pain, redness, tearing, difficulty keeping eye open, and light sensitivity. Do not drive or operate machinery if your eye is patched.  Antibiotic eye drops may be prescribed to reduce the risk of infection.  It is important to follow up with an ophthalmologist (eye doctor) to ensure proper healing.    SEEK IMMEDIATE MEDICAL CARE IF YOU HAVE ANY OF THE FOLLOWING SYMPTOMS: discharge from eyes, changes in vision, fever, or swelling.

## 2022-03-17 NOTE — ED ADULT NURSE NOTE - OBJECTIVE STATEMENT
pt A&Ox3. pt c/o right corneal abrasion. pt states daughter accidentally poked right eye while her contact was in. pt denies vision changes. pt respirations even and unlabored with no accessory muscle use. pt medicated by MD with eye drops. pt stable and ambulatory for DC.

## 2022-03-17 NOTE — ED PROVIDER NOTE - CLINICAL SUMMARY MEDICAL DECISION MAKING FREE TEXT BOX
Detail Level: Detailed
R eye with corneal abrasion at 6 o'clock position   plan  - ofloxacin drops  - f/u with ophthalmology

## 2022-03-17 NOTE — ED PROVIDER NOTE - PATIENT PORTAL LINK FT
You can access the FollowMyHealth Patient Portal offered by [facility] by registering at the following website: [email or web address]. By joining [employer]’s FollowMyHealth portal, you will also be able to view your health information using other applications (apps) compatible with our system.

## 2022-03-17 NOTE — ED PROVIDER NOTE - NSFOLLOWUPCLINICS_GEN_ALL_ED_FT
[facility] Ophthal[facility]  Ophthalmology  [address], [address]  [place] [address]  Phone: [phone]  Fax:   Follow Up Time: Urgent

## 2022-03-18 ENCOUNTER — NON-APPOINTMENT (OUTPATIENT)
Age: 27
End: 2022-03-18

## 2022-03-18 ENCOUNTER — APPOINTMENT (OUTPATIENT)
Dept: OPHTHALMOLOGY | Facility: CLINIC | Age: 27
End: 2022-03-18
Payer: MEDICARE

## 2022-03-18 PROCEDURE — 92002 INTRM OPH EXAM NEW PATIENT: CPT

## 2022-03-21 ENCOUNTER — NON-APPOINTMENT (OUTPATIENT)
Age: 27
End: 2022-03-21

## 2022-03-21 ENCOUNTER — APPOINTMENT (OUTPATIENT)
Dept: OPHTHALMOLOGY | Facility: CLINIC | Age: 27
End: 2022-03-21
Payer: MEDICARE

## 2022-03-21 PROCEDURE — 92012 INTRM OPH EXAM EST PATIENT: CPT

## 2022-03-24 NOTE — OB RN PATIENT PROFILE - SPIRITUAL CULTURAL, CURRENT SITUATION, PROFILE
Outreach attempt was made to schedule a Medicare Wellness Visit. This was the first attempt. Contact was not made, no answer/busy.  
Outreach attempt was made to schedule a Medicare Wellness Visit. This was the second attempt. Contact was not made, left message.  
denies

## 2022-03-30 ENCOUNTER — APPOINTMENT (OUTPATIENT)
Dept: OPHTHALMOLOGY | Facility: CLINIC | Age: 27
End: 2022-03-30

## 2022-04-18 ENCOUNTER — NON-APPOINTMENT (OUTPATIENT)
Age: 27
End: 2022-04-18

## 2022-04-18 ENCOUNTER — APPOINTMENT (OUTPATIENT)
Dept: OPHTHALMOLOGY | Facility: CLINIC | Age: 27
End: 2022-04-18
Payer: COMMERCIAL

## 2022-04-18 PROCEDURE — 92012 INTRM OPH EXAM EST PATIENT: CPT

## 2022-05-09 ENCOUNTER — APPOINTMENT (OUTPATIENT)
Dept: OPHTHALMOLOGY | Facility: CLINIC | Age: 27
End: 2022-05-09

## 2022-05-25 ENCOUNTER — NON-APPOINTMENT (OUTPATIENT)
Age: 27
End: 2022-05-25

## 2022-05-25 ENCOUNTER — APPOINTMENT (OUTPATIENT)
Dept: OPHTHALMOLOGY | Facility: CLINIC | Age: 27
End: 2022-05-25
Payer: COMMERCIAL

## 2022-05-25 PROCEDURE — 92012 INTRM OPH EXAM EST PATIENT: CPT | Mod: 25

## 2022-05-25 PROCEDURE — 68761 CLOSE TEAR DUCT OPENING: CPT | Mod: E4

## 2022-06-08 ENCOUNTER — APPOINTMENT (OUTPATIENT)
Dept: OPHTHALMOLOGY | Facility: CLINIC | Age: 27
End: 2022-06-08
Payer: COMMERCIAL

## 2022-06-08 ENCOUNTER — NON-APPOINTMENT (OUTPATIENT)
Age: 27
End: 2022-06-08

## 2022-06-08 PROCEDURE — 92012 INTRM OPH EXAM EST PATIENT: CPT

## 2022-07-25 ENCOUNTER — LABORATORY RESULT (OUTPATIENT)
Age: 27
End: 2022-07-25

## 2022-07-25 ENCOUNTER — APPOINTMENT (OUTPATIENT)
Dept: ANTEPARTUM | Facility: CLINIC | Age: 27
End: 2022-07-25

## 2022-07-25 ENCOUNTER — ASOB RESULT (OUTPATIENT)
Age: 27
End: 2022-07-25

## 2022-07-25 PROCEDURE — 76813 OB US NUCHAL MEAS 1 GEST: CPT

## 2022-07-25 PROCEDURE — 76801 OB US < 14 WKS SINGLE FETUS: CPT

## 2022-07-25 PROCEDURE — 36416 COLLJ CAPILLARY BLOOD SPEC: CPT

## 2022-07-29 NOTE — OB PROVIDER TRIAGE NOTE - NS_FETALHEARTHEAR_OBGYN_ALL_OB
"Chief Complaint   Patient presents with     Pre-Op Exam     Swelling in toes       /67 (BP Location: Right arm, Patient Position: Sitting, Cuff Size: Adult Regular)   Pulse 105   Resp 16   Ht 5' 2.99\" (160 cm)   Wt 133 lb 2.5 oz (60.4 kg)   SpO2 97%   BMI 23.59 kg/m      Peds Outpatient BP  1) Rested for 5 minutes, BP taken on bare arm, patient sitting (or supine for infants) w/ legs uncrossed?   Yes  2) Right arm used?  Right arm   Yes  3) Arm circumference of largest part of upper arm (in cm): 30.5cm  4) BP cuff sized used: Adult (25-32cm)   If used different size cuff then what was recommended why? N/A  5) First BP reading:machine   BP Readings from Last 1 Encounters:   07/29/22 116/67 (83 %, Z = 0.95 /  71 %, Z = 0.55)*     *BP percentiles are based on the 2017 AAP Clinical Practice Guideline for boys      Is reading >90%?No   (90% for <1 years is 90/50)  (90% for >18 years is 140/90)  *If a machine BP is at or above 90% take manual BP  6) Manual BP reading: N/A  7) Other comments: None     Nicole Fountain  July 29, 2022  "
Yes

## 2022-09-01 ENCOUNTER — OUTPATIENT (OUTPATIENT)
Dept: INPATIENT UNIT | Facility: HOSPITAL | Age: 27
LOS: 1 days | Discharge: HOME | End: 2022-09-01

## 2022-09-01 ENCOUNTER — EMERGENCY (EMERGENCY)
Facility: HOSPITAL | Age: 27
LOS: 1 days | Discharge: STILL ADMITTED OR ADMINISTRATIVE | End: 2022-09-01
Admitting: EMERGENCY MEDICINE

## 2022-09-01 VITALS
TEMPERATURE: 98 F | SYSTOLIC BLOOD PRESSURE: 124 MMHG | HEART RATE: 100 BPM | OXYGEN SATURATION: 100 % | RESPIRATION RATE: 16 BRPM | DIASTOLIC BLOOD PRESSURE: 79 MMHG | HEIGHT: 62 IN

## 2022-09-01 VITALS
RESPIRATION RATE: 16 BRPM | DIASTOLIC BLOOD PRESSURE: 74 MMHG | TEMPERATURE: 99 F | SYSTOLIC BLOOD PRESSURE: 120 MMHG | HEART RATE: 99 BPM

## 2022-09-01 VITALS — SYSTOLIC BLOOD PRESSURE: 95 MMHG | DIASTOLIC BLOOD PRESSURE: 65 MMHG | HEART RATE: 76 BPM

## 2022-09-01 DIAGNOSIS — Z3A.00 WEEKS OF GESTATION OF PREGNANCY NOT SPECIFIED: ICD-10-CM

## 2022-09-01 DIAGNOSIS — O26.899 OTHER SPECIFIED PREGNANCY RELATED CONDITIONS, UNSPECIFIED TRIMESTER: ICD-10-CM

## 2022-09-01 LAB
APPEARANCE UR: CLEAR — SIGNIFICANT CHANGE UP
BILIRUB UR-MCNC: NEGATIVE — SIGNIFICANT CHANGE UP
COLOR SPEC: COLORLESS — SIGNIFICANT CHANGE UP
DIFF PNL FLD: NEGATIVE — SIGNIFICANT CHANGE UP
GLUCOSE UR QL: NEGATIVE — SIGNIFICANT CHANGE UP
KETONES UR-MCNC: NEGATIVE — SIGNIFICANT CHANGE UP
LEUKOCYTE ESTERASE UR-ACNC: NEGATIVE — SIGNIFICANT CHANGE UP
NITRITE UR-MCNC: NEGATIVE — SIGNIFICANT CHANGE UP
PH UR: 7 — SIGNIFICANT CHANGE UP (ref 5–8)
PROT UR-MCNC: NEGATIVE — SIGNIFICANT CHANGE UP
SP GR SPEC: 1 — LOW (ref 1.01–1.05)
UROBILINOGEN FLD QL: SIGNIFICANT CHANGE UP

## 2022-09-01 PROCEDURE — 99213 OFFICE O/P EST LOW 20 MIN: CPT | Mod: 25

## 2022-09-01 PROCEDURE — 76817 TRANSVAGINAL US OBSTETRIC: CPT | Mod: 26

## 2022-09-01 PROCEDURE — 76815 OB US LIMITED FETUS(S): CPT | Mod: 26

## 2022-09-01 PROCEDURE — L9996: CPT

## 2022-09-01 NOTE — OB PROVIDER TRIAGE NOTE - ADDITIONAL INSTRUCTIONS
UA sent    scant brown discharge noted. No evidence of active vaginal bleeding or UTI  d/w MD Ramos  pt to be discharged home with  OB labor instructions  pelvic rest for the next couple of weeks until next OBGYN office appt  follow up with OBGYN as directed; pt to keep anatomy scan appt for .

## 2022-09-01 NOTE — OB RN TRIAGE NOTE - FALL HARM RISK - UNIVERSAL INTERVENTIONS
Bed in lowest position, wheels locked, appropriate side rails in place/Call bell, personal items and telephone in reach/Instruct patient to call for assistance before getting out of bed or chair/Non-slip footwear when patient is out of bed/[place] to call system/Physically safe environment - no spills, clutter or unnecessary equipment/Purposeful Proactive Rounding/Room/bathroom lighting operational, light cord in reach

## 2022-09-01 NOTE — ED ADULT TRIAGE NOTE - CHIEF COMPLAINT QUOTE
[facility] Detox Discharge Summary


Admission Date: 


12/03/19





Discharge Date: 12/05/19





- History


Present History: Alcohol Dependence, Cannabis Dependence


Additional Comments: 





patient signed release ama





- Physical Exam Results


Vital Signs: 


 Vital Signs











Temperature  96.9 F L  12/05/19 09:30


 


Pulse Rate  111 H  12/05/19 09:30


 


Respiratory Rate  18   12/05/19 09:30


 


Blood Pressure  117/87   12/05/19 09:30


 


O2 Sat by Pulse Oximetry (%)      











Pertinent Admission Physical Exam Findings: 





withdrawal signs and symptom





- Medication


Discharge Medications: 


Ambulatory Orders





NK [No Known Home Medication]  07/31/18 











- Diagnosis


(1) Alcohol intoxication


Current Visit: Yes   Status: Acute   





(2) Alcohol dependence with uncomplicated withdrawal


Current Visit: Yes   Status: Chronic   





(3) Cannabis dependence


Current Visit: Yes   Status: Chronic   





- AMA


Did Patient Leave Against Medical Advice: Yes
Pt  approx 18 weeks pregnant c/o brown vaginal discharge since today, sent by SANDY Bhatt. DENNIS 2/3. Denies vaginal bleeding, abd cramping, n/v. Spoke to L&D, pt okay to go to L&D. Denies pmhx.

## 2022-09-01 NOTE — OB PROVIDER TRIAGE NOTE - HISTORY OF PRESENT ILLNESS
26 y/o  at 17.6 weeks gestation sent up from ER for brown vaginal discharge occurring at 15:30, with no other occurrences. Pt also endorses right groin pain for the last 2 days, pain 2 out of 10 on pain scale. Denies abdominal cramping, pain, recent sexual intercourse, new exercises, recent infections, vaginal itching. Pt is COVID vaccinated. Pt is scheduled for anatomy scan on .    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

## 2022-09-01 NOTE — OB PROVIDER TRIAGE NOTE - NSHPLABSRESULTS_GEN_ALL_CORE
Urinalysis Basic - ( 01 Sep 2022 18:38 )    Color: Colorless / Appearance: Clear / S.005 / pH: x  Gluc: x / Ketone: Negative  / Bili: Negative / Urobili: <2 mg/dL   Blood: x / Protein: Negative / Nitrite: Negative   Leuk Esterase: Negative / RBC: x / WBC x   Sq Epi: x / Non Sq Epi: x / Bacteria: x

## 2022-09-01 NOTE — OB PROVIDER TRIAGE NOTE - NSHPPHYSICALEXAM_GEN_ALL_CORE
A&O x3  abdomen: gravid, soft, nontender  SSE: no active bleeding visualized, mild brown tinged discharge noted. Cervix appears closed  TVS: 2.7-2.88cm cervical length, no dynamic changes  TAS: transverse position  anterior placenta  MVP 5.32  FHR: 148bpm  + gross FM    Vital Signs Last 24 Hrs  T(C): 37.2 (01 Sep 2022 17:28), Max: 37.2 (01 Sep 2022 17:28)  T(F): 99 (01 Sep 2022 17:28), Max: 99 (01 Sep 2022 17:28)  HR: 75 (01 Sep 2022 19:05) (75 - 100)  BP: 96/64 (01 Sep 2022 19:05) (75/47 - 124/79)  BP(mean): --  RR: 16 (01 Sep 2022 17:28) (16 - 16)  SpO2: 100% (01 Sep 2022 17:00) (100% - 100%)

## 2022-09-07 ENCOUNTER — APPOINTMENT (OUTPATIENT)
Dept: MATERNAL FETAL MEDICINE | Facility: CLINIC | Age: 27
End: 2022-09-07

## 2022-09-07 ENCOUNTER — ASOB RESULT (OUTPATIENT)
Age: 27
End: 2022-09-07

## 2022-09-07 PROCEDURE — 99205 OFFICE O/P NEW HI 60 MIN: CPT | Mod: 95

## 2022-09-12 ENCOUNTER — ASOB RESULT (OUTPATIENT)
Age: 27
End: 2022-09-12

## 2022-09-12 ENCOUNTER — APPOINTMENT (OUTPATIENT)
Dept: ANTEPARTUM | Facility: CLINIC | Age: 27
End: 2022-09-12

## 2022-09-12 PROCEDURE — 76811 OB US DETAILED SNGL FETUS: CPT

## 2022-09-13 ENCOUNTER — LABORATORY RESULT (OUTPATIENT)
Age: 27
End: 2022-09-13

## 2022-09-13 ENCOUNTER — APPOINTMENT (OUTPATIENT)
Dept: MATERNAL FETAL MEDICINE | Facility: CLINIC | Age: 27
End: 2022-09-13

## 2022-09-13 ENCOUNTER — ASOB RESULT (OUTPATIENT)
Age: 27
End: 2022-09-13

## 2022-09-13 PROCEDURE — 99214 OFFICE O/P EST MOD 30 MIN: CPT | Mod: 25

## 2022-09-14 ENCOUNTER — APPOINTMENT (OUTPATIENT)
Dept: OPHTHALMOLOGY | Facility: CLINIC | Age: 27
End: 2022-09-14

## 2022-09-29 ENCOUNTER — NON-APPOINTMENT (OUTPATIENT)
Age: 27
End: 2022-09-29

## 2022-10-19 ENCOUNTER — APPOINTMENT (OUTPATIENT)
Dept: MATERNAL FETAL MEDICINE | Facility: CLINIC | Age: 27
End: 2022-10-19

## 2022-10-19 ENCOUNTER — ASOB RESULT (OUTPATIENT)
Age: 27
End: 2022-10-19

## 2022-10-19 PROCEDURE — 99215 OFFICE O/P EST HI 40 MIN: CPT | Mod: 95

## 2022-12-12 ENCOUNTER — OUTPATIENT (OUTPATIENT)
Dept: INPATIENT UNIT | Facility: HOSPITAL | Age: 27
LOS: 1 days | Discharge: HOME | End: 2022-12-12

## 2022-12-12 ENCOUNTER — EMERGENCY (EMERGENCY)
Facility: HOSPITAL | Age: 27
LOS: 1 days | Discharge: STILL ADMITTED OR ADMINISTRATIVE | End: 2022-12-12
Admitting: EMERGENCY MEDICINE

## 2022-12-12 VITALS
TEMPERATURE: 100 F | DIASTOLIC BLOOD PRESSURE: 59 MMHG | SYSTOLIC BLOOD PRESSURE: 98 MMHG | RESPIRATION RATE: 17 BRPM | HEART RATE: 116 BPM

## 2022-12-12 VITALS — HEART RATE: 95 BPM | OXYGEN SATURATION: 97 %

## 2022-12-12 VITALS
RESPIRATION RATE: 18 BRPM | TEMPERATURE: 100 F | DIASTOLIC BLOOD PRESSURE: 61 MMHG | HEART RATE: 120 BPM | OXYGEN SATURATION: 100 % | SYSTOLIC BLOOD PRESSURE: 105 MMHG

## 2022-12-12 DIAGNOSIS — O26.899 OTHER SPECIFIED PREGNANCY RELATED CONDITIONS, UNSPECIFIED TRIMESTER: ICD-10-CM

## 2022-12-12 LAB
APPEARANCE UR: CLEAR — SIGNIFICANT CHANGE UP
BACTERIA # UR AUTO: NEGATIVE — SIGNIFICANT CHANGE UP
BASOPHILS # BLD AUTO: 0.06 K/UL — SIGNIFICANT CHANGE UP (ref 0–0.2)
BASOPHILS NFR BLD AUTO: 0.7 % — SIGNIFICANT CHANGE UP (ref 0–2)
BILIRUB UR-MCNC: NEGATIVE — SIGNIFICANT CHANGE UP
COLOR SPEC: COLORLESS — SIGNIFICANT CHANGE UP
DIFF PNL FLD: NEGATIVE — SIGNIFICANT CHANGE UP
EOSINOPHIL # BLD AUTO: 0.02 K/UL — SIGNIFICANT CHANGE UP (ref 0–0.5)
EOSINOPHIL NFR BLD AUTO: 0.2 % — SIGNIFICANT CHANGE UP (ref 0–6)
EPI CELLS # UR: 3 /HPF — SIGNIFICANT CHANGE UP (ref 0–5)
FLUAV AG NPH QL: SIGNIFICANT CHANGE UP
FLUBV AG NPH QL: SIGNIFICANT CHANGE UP
GLUCOSE UR QL: NEGATIVE — SIGNIFICANT CHANGE UP
HCT VFR BLD CALC: 28.7 % — LOW (ref 34.5–45)
HGB BLD-MCNC: 8.8 G/DL — LOW (ref 11.5–15.5)
HYALINE CASTS # UR AUTO: 0 /LPF — SIGNIFICANT CHANGE UP (ref 0–7)
IANC: 6.7 K/UL — SIGNIFICANT CHANGE UP (ref 1.8–7.4)
IMM GRANULOCYTES NFR BLD AUTO: 1.2 % — HIGH (ref 0–0.9)
KETONES UR-MCNC: NEGATIVE — SIGNIFICANT CHANGE UP
LEUKOCYTE ESTERASE UR-ACNC: ABNORMAL
LYMPHOCYTES # BLD AUTO: 0.94 K/UL — LOW (ref 1–3.3)
LYMPHOCYTES # BLD AUTO: 10.5 % — LOW (ref 13–44)
MCHC RBC-ENTMCNC: 24.6 PG — LOW (ref 27–34)
MCHC RBC-ENTMCNC: 30.7 GM/DL — LOW (ref 32–36)
MCV RBC AUTO: 80.4 FL — SIGNIFICANT CHANGE UP (ref 80–100)
MONOCYTES # BLD AUTO: 1.1 K/UL — HIGH (ref 0–0.9)
MONOCYTES NFR BLD AUTO: 12.3 % — SIGNIFICANT CHANGE UP (ref 2–14)
NEUTROPHILS # BLD AUTO: 6.7 K/UL — SIGNIFICANT CHANGE UP (ref 1.8–7.4)
NEUTROPHILS NFR BLD AUTO: 75.1 % — SIGNIFICANT CHANGE UP (ref 43–77)
NITRITE UR-MCNC: NEGATIVE — SIGNIFICANT CHANGE UP
NRBC # BLD: 0 /100 WBCS — SIGNIFICANT CHANGE UP (ref 0–0)
NRBC # FLD: 0 K/UL — SIGNIFICANT CHANGE UP (ref 0–0)
PH UR: 6.5 — SIGNIFICANT CHANGE UP (ref 5–8)
PLATELET # BLD AUTO: 195 K/UL — SIGNIFICANT CHANGE UP (ref 150–400)
PROT UR-MCNC: NEGATIVE — SIGNIFICANT CHANGE UP
RBC # BLD: 3.57 M/UL — LOW (ref 3.8–5.2)
RBC # FLD: 13.9 % — SIGNIFICANT CHANGE UP (ref 10.3–14.5)
RBC CASTS # UR COMP ASSIST: 0 /HPF — SIGNIFICANT CHANGE UP (ref 0–4)
RSV RNA NPH QL NAA+NON-PROBE: SIGNIFICANT CHANGE UP
SARS-COV-2 RNA SPEC QL NAA+PROBE: DETECTED
SP GR SPEC: 1 — LOW (ref 1.01–1.05)
UROBILINOGEN FLD QL: SIGNIFICANT CHANGE UP
WBC # BLD: 8.93 K/UL — SIGNIFICANT CHANGE UP (ref 3.8–10.5)
WBC # FLD AUTO: 8.93 K/UL — SIGNIFICANT CHANGE UP (ref 3.8–10.5)
WBC UR QL: 5 /HPF — SIGNIFICANT CHANGE UP (ref 0–5)

## 2022-12-12 PROCEDURE — L9996: CPT

## 2022-12-12 RX ORDER — ACETAMINOPHEN 500 MG
1000 TABLET ORAL ONCE
Refills: 0 | Status: COMPLETED | OUTPATIENT
Start: 2022-12-12 | End: 2022-12-12

## 2022-12-12 RX ORDER — SODIUM CHLORIDE 9 MG/ML
500 INJECTION, SOLUTION INTRAVENOUS
Refills: 0 | Status: DISCONTINUED | OUTPATIENT
Start: 2022-12-12 | End: 2022-12-26

## 2022-12-12 RX ORDER — NIRMATRELVIR AND RITONAVIR 150-100 MG
3 KIT ORAL
Qty: 30 | Refills: 0
Start: 2022-12-12 | End: 2022-12-16

## 2022-12-12 RX ORDER — SODIUM CHLORIDE 9 MG/ML
1000 INJECTION, SOLUTION INTRAVENOUS ONCE
Refills: 0 | Status: COMPLETED | OUTPATIENT
Start: 2022-12-12 | End: 2022-12-12

## 2022-12-12 RX ADMIN — SODIUM CHLORIDE 1000 MILLILITER(S): 9 INJECTION, SOLUTION INTRAVENOUS at 04:25

## 2022-12-12 RX ADMIN — Medication 1000 MILLIGRAM(S): at 06:46

## 2022-12-12 RX ADMIN — SODIUM CHLORIDE 125 MILLILITER(S): 9 INJECTION, SOLUTION INTRAVENOUS at 06:23

## 2022-12-12 NOTE — ED ADULT TRIAGE NOTE - CHIEF COMPLAINT QUOTE
Pt 33 wks pregnant, DENNIS 2/3/23, . C/o lower abd pain, and b/l flank pain x1 day. Denies urinary symptoms, nausea, or vomiting. Denies vag bleed/water breaking. Pt COVID+ through at-home test today.    Per MD Mcclain, pt to be seen in L&D

## 2022-12-12 NOTE — OB PROVIDER TRIAGE NOTE - PLAN OF CARE
- Please take Paxlovid as prescribed.   - You must isolate for 5 days beginning the day after you developed symptoms. If you remain fever free without the use of medications after 5 days of isolation, you may end your isolation but you must continue to wear a tight fitting mask for 5 more days. Please stay well hydrated and take Tylenol as needed.   - Please follow up with your obstetrician at your next scheduled appointment.   - Please return for decreased / no fetal movement, vaginal bleeding similar to that of a period, leaking / gush of fluid, regular contractions occurring 4-5 minutes  for one hour or requiring pain medication

## 2022-12-12 NOTE — OB PROVIDER TRIAGE NOTE - NSHPPHYSICALEXAM_GEN_ALL_CORE
Vital Signs Last 24 Hrs  T(C): 37.7 (12 Dec 2022 03:36), Max: 37.7 (12 Dec 2022 02:25)  T(F): 99.86 (12 Dec 2022 03:36), Max: 99.9 (12 Dec 2022 03:22)  HR: 116 (12 Dec 2022 03:39) (116 - 120)  BP: 98/59 (12 Dec 2022 03:39) (98/59 - 105/61)  BP(mean): --  RR: 17 (12 Dec 2022 03:22) (17 - 18)  SpO2: 100% (12 Dec 2022 02:25) (100% - 100%)    gen: a/o x 3, comfortable  pulm: CTA b/l  cardio: RRR  abd: soft and nontender, gravid  SSE: deferred  SVE: 0/50/-3  TAS: vertex by sono, anterior placenta, BPP 8/8, CHAR 11.7  TVS: cervix 3.54-3.83 cm, no evidence funneling/dynamic changes   EFM: baseline 155 with mod variability, pos accels, no decels - overall reactive and reassuring   TOCO: irregular contractions -  pt denies feeling contractions

## 2022-12-12 NOTE — OB PROVIDER TRIAGE NOTE - NSOBPROVIDERNOTE_OBGYN_ALL_OB_FT
28 y/o female  @ 32.2GA with SLIUP, PNC complicated by positive Antibody E and C, presents c/o flu-like symptoms.    SVE: 0/50/-3  TAS: vertex by sono, anterior placenta, BPP 8/8, CHAR 11.7  TVS: cervix 3.54-3.83 cm, no evidence funneling/dynamic changes   EFM: baseline 155 with mod variability, pos accels, no decels - overall reactive and reassuring   TOCO: irregular contractions -  pt denies feeling contractions    ice chips given  defers tylenol  LR bolus administered  CBC, flu panel, UA    Pt reports feeling GFM in triage 28 y/o female  @ 32.2GA with SLIUP, PNC complicated by positive Antibody E and C, presents c/o flu-like symptoms.    SVE: 0/50/-3  TAS: vertex by sono, anterior placenta, BPP 8/8, CHAR 11.7  TVS: cervix 3.54-3.83 cm, no evidence funneling/dynamic changes   EFM: baseline 155 with mod variability, pos accels, no decels - overall reactive and reassuring   TOCO: irregular contractions -  pt denies feeling contractions    ice chips given  defers tylenol  LR bolus administered  CBC, flu panel, UA    Pt reports feeling GFM in triage    maintenance LR ordered  Rectal temp to be done 28 y/o female  @ 32.2GA with SLIUP, PNC complicated by positive Antibody E and C, presents c/o flu-like symptoms.    SVE: 0/50/-3  TAS: vertex by sono, anterior placenta, BPP 8/8, CHAR 11.7  TVS: cervix 3.54-3.83 cm, no evidence funneling/dynamic changes   EFM: baseline 155 with mod variability, pos accels, no decels - overall reactive and reassuring   TOCO: irregular contractions -  pt denies feeling contractions    ice chips given  defers tylenol  LR bolus administered  CBC, flu panel, UA    Pt reports feeling GFM in triage    maintenance LR ordered  Rectal temp to be done - 38.5C, 101.3F 26 y/o female  @ 32.2GA with SLIUP, PNC complicated by positive Antibody E and C, presents c/o flu-like symptoms.    SVE: 0/50/-3  TAS: vertex by sono, anterior placenta, BPP 8/8, CHAR 11.7  TVS: cervix 3.54-3.83 cm, no evidence funneling/dynamic changes   EFM: baseline 155 with mod variability, pos accels, no decels - overall reactive and reassuring   TOCO: irregular contractions -  pt denies feeling contractions    ice chips given  defers tylenol  LR bolus administered  CBC, flu panel, UA    Pt reports feeling GFM in triage    maintenance LR ordered  Rectal temp to be done - 38.5C, 101.3F  tylenol 1000 po adminstered    @ 7:08 am flu panel results return: positive covid 28 y/o female  @ 32.2GA with SLIUP, PNC complicated by positive Antibody E and C, presents c/o flu-like symptoms.    SVE: 0/50/-3  TAS: vertex by sono, anterior placenta, BPP 8/8, CHAR 11.7  TVS: cervix 3.54-3.83 cm, no evidence funneling/dynamic changes   EFM: baseline 155 with mod variability, pos accels, no decels - overall reactive and reassuring   TOCO: irregular contractions -  pt denies feeling contractions    ice chips given  defers tylenol  LR bolus administered  CBC, flu panel, UA    Pt reports feeling GFM in triage    maintenance LR ordered  Rectal temp to be done - 38.5C, 101.3F  tylenol 1000 po adminstered    @ 7:08 am flu panel results return: positive covid    -----------------------------------------------------------------------------------------------  Received patient from sign out    Patient notes improvement of chills after tylenol and fluids. 28 y/o female  @ 32.2GA with SLIUP, PNC complicated by positive Antibody E and C, presents c/o flu-like symptoms.    SVE: 0/50/-3  TAS: vertex by sono, anterior placenta, BPP 8/8, CHAR 11.7  TVS: cervix 3.54-3.83 cm, no evidence funneling/dynamic changes   EFM: baseline 155 with mod variability, pos accels, no decels - overall reactive and reassuring   TOCO: irregular contractions -  pt denies feeling contractions    ice chips given  defers tylenol  LR bolus administered  CBC, flu panel, UA    Pt reports feeling GFM in triage    maintenance LR ordered  Rectal temp to be done - 38.5C, 101.3F  tylenol 1000 po adminstered    @ 7:08 am flu panel results return: positive covid    -----------------------------------------------------------------------------------------------  28 y/o female  @ 32.2GA with SLIUP, PNC complicated by positive Antibody E and C, presents c/o flu-like symptoms without evidence of PTL with positive Covid-19    - Received patient from sign out  - Patient notes improvement of chills after tylenol and fluids    Plan  - Patient to be discharged home with follow up and return precautions  - Please take Paxlovid as prescribed.   - You must isolate for 5 days beginning the day after you developed symptoms. If you remain fever free without the use of medications after 5 days of isolation, you may end your isolation but you must continue to wear a tight fitting mask for 5 more days. Please stay well hydrated and take Tylenol as needed.   - Please follow up with your obstetrician at your next scheduled appointment.   - Please return for decreased / no fetal movement, vaginal bleeding similar to that of a period, leaking / gush of fluid, regular contractions occurring 4-5 minutes  for one hour or requiring pain medication   - Patient educated of plan and demonstrate understanding. All questions answered. Discharge instructions provided and signed.     Plan d/w Dr. Wesley

## 2022-12-12 NOTE — OB RN TRIAGE NOTE - FALL HARM RISK - UNIVERSAL INTERVENTIONS
Bed in lowest position, wheels locked, appropriate side rails in place/Call bell, personal items and telephone in reach/Instruct patient to call for assistance before getting out of bed or chair/Non-slip footwear when patient is out of bed/[place] to call system/Physically safe environment - no spills, clutter or unnecessary equipment/Purposeful Proactive Rounding/Room/bathroom lighting operational, light cord in reach

## 2022-12-12 NOTE — OB PROVIDER TRIAGE NOTE - HISTORY OF PRESENT ILLNESS
28 y/o female  @ 32.2GA with SLIUP, PNC complicated by positive Antibody E and C, presents c/o flu-like symptoms. States she tested positive for covid on a at-home test. Reports lower abdominal pain, minimal nonproductive cough, head congestion, low back pain, body aches, sore throat, nausea. Pt reports less than usual FM from 9pm. Pt reports temp of 100F. Pt defers tylenol or other medication. denies LOF, VB, contractions, urinary symptoms, vomiting, diarrhea.    med hx: denies  surg hx: denies   Ob:  21 @ 36GA, IOL 2/2 cholestasis  Gyn: denies hx fibroids, abnormal pap smears, cysts, STDs, HSV  Allergies: sulfa drugs - swelling  Medications: PNV  Psych: denies  Social: denies alc/drugs/tobacco

## 2022-12-13 LAB
CULTURE RESULTS: SIGNIFICANT CHANGE UP
SPECIMEN SOURCE: SIGNIFICANT CHANGE UP

## 2022-12-22 DIAGNOSIS — O36.1930 MATERNAL CARE FOR OTHER ISOIMMUNIZATION, THIRD TRIMESTER, NOT APPLICABLE OR UNSPECIFIED: ICD-10-CM

## 2022-12-22 DIAGNOSIS — O47.03 FALSE LABOR BEFORE 37 COMPLETED WEEKS OF GESTATION, THIRD TRIMESTER: ICD-10-CM

## 2022-12-22 DIAGNOSIS — Z3A.32 32 WEEKS GESTATION OF PREGNANCY: ICD-10-CM

## 2022-12-22 DIAGNOSIS — O98.513 OTHER VIRAL DISEASES COMPLICATING PREGNANCY, THIRD TRIMESTER: ICD-10-CM

## 2022-12-22 DIAGNOSIS — U07.1 COVID-19: ICD-10-CM

## 2022-12-22 DIAGNOSIS — O36.8130 DECREASED FETAL MOVEMENTS, THIRD TRIMESTER, NOT APPLICABLE OR UNSPECIFIED: ICD-10-CM

## 2022-12-30 ENCOUNTER — APPOINTMENT (OUTPATIENT)
Dept: ANTEPARTUM | Facility: CLINIC | Age: 27
End: 2022-12-30
Payer: COMMERCIAL

## 2022-12-30 ENCOUNTER — ASOB RESULT (OUTPATIENT)
Age: 27
End: 2022-12-30

## 2022-12-30 PROCEDURE — 76816 OB US FOLLOW-UP PER FETUS: CPT

## 2022-12-30 PROCEDURE — 76818 FETAL BIOPHYS PROFILE W/NST: CPT | Mod: 59

## 2022-12-30 PROCEDURE — 99202 OFFICE O/P NEW SF 15 MIN: CPT | Mod: 25

## 2023-01-01 ENCOUNTER — EMERGENCY (EMERGENCY)
Facility: HOSPITAL | Age: 28
LOS: 1 days | Discharge: STILL ADMITTED OR ADMINISTRATIVE | End: 2023-01-01
Admitting: EMERGENCY MEDICINE
Payer: COMMERCIAL

## 2023-01-01 ENCOUNTER — INPATIENT (INPATIENT)
Facility: HOSPITAL | Age: 28
LOS: 2 days | Discharge: HOME | End: 2023-01-04
Attending: STUDENT IN AN ORGANIZED HEALTH CARE EDUCATION/TRAINING PROGRAM | Admitting: STUDENT IN AN ORGANIZED HEALTH CARE EDUCATION/TRAINING PROGRAM
Payer: COMMERCIAL

## 2023-01-01 VITALS
RESPIRATION RATE: 16 BRPM | OXYGEN SATURATION: 100 % | TEMPERATURE: 98 F | HEART RATE: 92 BPM | SYSTOLIC BLOOD PRESSURE: 113 MMHG | DIASTOLIC BLOOD PRESSURE: 64 MMHG

## 2023-01-01 VITALS
SYSTOLIC BLOOD PRESSURE: 129 MMHG | TEMPERATURE: 98 F | HEART RATE: 89 BPM | RESPIRATION RATE: 17 BRPM | DIASTOLIC BLOOD PRESSURE: 61 MMHG

## 2023-01-01 DIAGNOSIS — O26.899 OTHER SPECIFIED PREGNANCY RELATED CONDITIONS, UNSPECIFIED TRIMESTER: ICD-10-CM

## 2023-01-01 DIAGNOSIS — O60.03 PRETERM LABOR WITHOUT DELIVERY, THIRD TRIMESTER: ICD-10-CM

## 2023-01-01 LAB
BASOPHILS # BLD AUTO: 0.07 K/UL — SIGNIFICANT CHANGE UP (ref 0–0.2)
BASOPHILS NFR BLD AUTO: 0.8 % — SIGNIFICANT CHANGE UP (ref 0–2)
BLD GP AB SCN SERPL QL: POSITIVE — SIGNIFICANT CHANGE UP
EOSINOPHIL # BLD AUTO: 0.05 K/UL — SIGNIFICANT CHANGE UP (ref 0–0.5)
EOSINOPHIL NFR BLD AUTO: 0.6 % — SIGNIFICANT CHANGE UP (ref 0–6)
HCT VFR BLD CALC: 33.2 % — LOW (ref 34.5–45)
HGB BLD-MCNC: 10 G/DL — LOW (ref 11.5–15.5)
IANC: 5.19 K/UL — SIGNIFICANT CHANGE UP (ref 1.8–7.4)
IMM GRANULOCYTES NFR BLD AUTO: 0.5 % — SIGNIFICANT CHANGE UP (ref 0–0.9)
LYMPHOCYTES # BLD AUTO: 2.24 K/UL — SIGNIFICANT CHANGE UP (ref 1–3.3)
LYMPHOCYTES # BLD AUTO: 27 % — SIGNIFICANT CHANGE UP (ref 13–44)
MCHC RBC-ENTMCNC: 23.4 PG — LOW (ref 27–34)
MCHC RBC-ENTMCNC: 30.1 GM/DL — LOW (ref 32–36)
MCV RBC AUTO: 77.6 FL — LOW (ref 80–100)
MONOCYTES # BLD AUTO: 0.7 K/UL — SIGNIFICANT CHANGE UP (ref 0–0.9)
MONOCYTES NFR BLD AUTO: 8.4 % — SIGNIFICANT CHANGE UP (ref 2–14)
NEUTROPHILS # BLD AUTO: 5.19 K/UL — SIGNIFICANT CHANGE UP (ref 1.8–7.4)
NEUTROPHILS NFR BLD AUTO: 62.7 % — SIGNIFICANT CHANGE UP (ref 43–77)
NRBC # BLD: 0 /100 WBCS — SIGNIFICANT CHANGE UP (ref 0–0)
NRBC # FLD: 0 K/UL — SIGNIFICANT CHANGE UP (ref 0–0)
PLATELET # BLD AUTO: 219 K/UL — SIGNIFICANT CHANGE UP (ref 150–400)
RBC # BLD: 4.28 M/UL — SIGNIFICANT CHANGE UP (ref 3.8–5.2)
RBC # FLD: 15.7 % — HIGH (ref 10.3–14.5)
RH IG SCN BLD-IMP: POSITIVE — SIGNIFICANT CHANGE UP
SARS-COV-2 RNA SPEC QL NAA+PROBE: DETECTED
WBC # BLD: 8.29 K/UL — SIGNIFICANT CHANGE UP (ref 3.8–10.5)
WBC # FLD AUTO: 8.29 K/UL — SIGNIFICANT CHANGE UP (ref 3.8–10.5)

## 2023-01-01 PROCEDURE — L9996: CPT

## 2023-01-01 PROCEDURE — 86077 PHYS BLOOD BANK SERV XMATCH: CPT

## 2023-01-01 PROCEDURE — 76815 OB US LIMITED FETUS(S): CPT | Mod: 26

## 2023-01-01 RX ORDER — CITRIC ACID/SODIUM CITRATE 300-500 MG
15 SOLUTION, ORAL ORAL EVERY 6 HOURS
Refills: 0 | Status: DISCONTINUED | OUTPATIENT
Start: 2023-01-01 | End: 2023-01-02

## 2023-01-01 RX ORDER — CHLORHEXIDINE GLUCONATE 213 G/1000ML
1 SOLUTION TOPICAL ONCE
Refills: 0 | Status: DISCONTINUED | OUTPATIENT
Start: 2023-01-01 | End: 2023-01-02

## 2023-01-01 RX ORDER — AMPICILLIN TRIHYDRATE 250 MG
2 CAPSULE ORAL ONCE
Refills: 0 | Status: COMPLETED | OUTPATIENT
Start: 2023-01-01 | End: 2023-01-01

## 2023-01-01 RX ORDER — OXYTOCIN 10 UNIT/ML
333.33 VIAL (ML) INJECTION
Qty: 20 | Refills: 0 | Status: DISCONTINUED | OUTPATIENT
Start: 2023-01-01 | End: 2023-01-02

## 2023-01-01 RX ORDER — AMPICILLIN TRIHYDRATE 250 MG
1 CAPSULE ORAL EVERY 4 HOURS
Refills: 0 | Status: DISCONTINUED | OUTPATIENT
Start: 2023-01-01 | End: 2023-01-02

## 2023-01-01 RX ORDER — SODIUM CHLORIDE 9 MG/ML
1000 INJECTION, SOLUTION INTRAVENOUS
Refills: 0 | Status: DISCONTINUED | OUTPATIENT
Start: 2023-01-01 | End: 2023-01-02

## 2023-01-01 RX ORDER — AMPICILLIN TRIHYDRATE 250 MG
CAPSULE ORAL
Refills: 0 | Status: DISCONTINUED | OUTPATIENT
Start: 2023-01-02 | End: 2023-01-02

## 2023-01-01 RX ADMIN — Medication 208 GRAM(S): at 22:02

## 2023-01-01 RX ADMIN — Medication 200 GRAM(S): at 18:00

## 2023-01-01 NOTE — OB PROVIDER H&P - HISTORY OF PRESENT ILLNESS
28yo  @ 35.2 presents with c/o painful contractions every 3 minutes. Denies LOF, VB and reports GFM.   Pregnancy complicated by by cholestasis- on ursidiol 300mg TID  GBS bactiuria  Fully vaccinated for covid-- H/O covid Dec 2021    H/O Blood transfusion after first baby-                  Has Anti E and C antibodies

## 2023-01-01 NOTE — OB RN PATIENT PROFILE - NS TRANSFER PATIENT BELONGINGS
I have reviewed discharge instructions with the patient. The patient verbalized understanding. Patient left ED via Discharge Method: wheelchair to Home with family. Opportunity for questions and clarification provided. Patient given 0 scripts. To continue your aftercare when you leave the hospital, you may receive an automated call from our care team to check in on how you are doing. This is a free service and part of our promise to provide the best care and service to meet your aftercare needs.  If you have questions, or wish to unsubscribe from this service please call [phone]. Thank you for Choosing our [facility] Emergency Department. Cell Phone/PDA (specify)/Clothing

## 2023-01-01 NOTE — OB PROVIDER TRIAGE NOTE - NSHPPHYSICALEXAM_GEN_ALL_CORE
Assessment reveals VSS, abdomen soft, NT, gravid.   VE 8/80/-4, no presenting part in pelvis, bulging bag      vtx confirmed by US  Cat 1 FHT, ctx q 4  EFW 6-8 by leopold Desires Epidural

## 2023-01-01 NOTE — OB RN PATIENT PROFILE - FALL HARM RISK - UNIVERSAL INTERVENTIONS
Bed in lowest position, wheels locked, appropriate side rails in place/Call bell, personal items and telephone in reach/Instruct patient to call for assistance before getting out of bed or chair/Non-slip footwear when patient is out of bed/[place] to call system/Physically safe environment - no spills, clutter or unnecessary equipment/Purposeful Proactive Rounding/Room/bathroom lighting operational, light cord in reach

## 2023-01-01 NOTE — OB RN TRIAGE NOTE - NS_OBGYNHISTORY_OBGYN_ALL_OB_FT
2021 at 36 wks 6 lbs 15 oz ( cholestasis)  2021 at 36 wks 6 lbs 15 oz ( cholestasis)  pt had blood transfusion after delivery

## 2023-01-01 NOTE — OB PROVIDER TRIAGE NOTE - NS_OBGYNHISTORY_OBGYN_ALL_OB_FT
2021 at 36 wks 6 lbs 15 oz ( cholestasis IOL)    AP course complicated by:  Cholestasis- ursidiol 300mg TID  GBS bactiuria  EFW this week 6-4

## 2023-01-01 NOTE — OB PROVIDER TRIAGE NOTE - CURRENT PREGNANCY COMPLICATIONS, OB PROFILE
Positive antibody E and C ab. Cholestasis/Gestational Age less than 36 Weeks/Maternal Positive GBS/Other

## 2023-01-01 NOTE — OB PROVIDER H&P - ASSESSMENT
Admit for labor  Epidural for pain management  COVID-19 PCR  GBS prophylaxis  Cross match x 2 units  D/W Dr. Alvarez

## 2023-01-01 NOTE — OB PROVIDER H&P - NS_OBGYNHISTORY_OBGYN_ALL_OB_FT
2021 at 36 wks 6 lbs 15 oz ( cholestasis IOL)- PPH with transfusion    AP course complicated by:  Cholestasis- ursidiol 300mg TID  GBS bactiuria  EFW this week 6-4

## 2023-01-02 ENCOUNTER — TRANSCRIPTION ENCOUNTER (OUTPATIENT)
Age: 28
End: 2023-01-02

## 2023-01-02 LAB
COVID-19 SPIKE DOMAIN AB INTERP: POSITIVE
COVID-19 SPIKE DOMAIN ANTIBODY RESULT: >250 U/ML — HIGH
SARS-COV-2 IGG+IGM SERPL QL IA: >250 U/ML — HIGH
SARS-COV-2 IGG+IGM SERPL QL IA: POSITIVE
T PALLIDUM AB TITR SER: NEGATIVE — SIGNIFICANT CHANGE UP

## 2023-01-02 RX ORDER — OXYCODONE HYDROCHLORIDE 5 MG/1
5 TABLET ORAL
Refills: 0 | Status: DISCONTINUED | OUTPATIENT
Start: 2023-01-02 | End: 2023-01-04

## 2023-01-02 RX ORDER — OXYCODONE HYDROCHLORIDE 5 MG/1
5 TABLET ORAL ONCE
Refills: 0 | Status: DISCONTINUED | OUTPATIENT
Start: 2023-01-02 | End: 2023-01-04

## 2023-01-02 RX ORDER — LANOLIN
1 OINTMENT (GRAM) TOPICAL EVERY 6 HOURS
Refills: 0 | Status: DISCONTINUED | OUTPATIENT
Start: 2023-01-02 | End: 2023-01-04

## 2023-01-02 RX ORDER — IBUPROFEN 200 MG
1 TABLET ORAL
Qty: 0 | Refills: 0 | DISCHARGE
Start: 2023-01-02

## 2023-01-02 RX ORDER — OXYTOCIN 10 UNIT/ML
10 VIAL (ML) INJECTION ONCE
Refills: 0 | Status: DISCONTINUED | OUTPATIENT
Start: 2023-01-02 | End: 2023-01-02

## 2023-01-02 RX ORDER — BENZOCAINE 10 %
1 GEL (GRAM) MUCOUS MEMBRANE EVERY 6 HOURS
Refills: 0 | Status: DISCONTINUED | OUTPATIENT
Start: 2023-01-02 | End: 2023-01-04

## 2023-01-02 RX ORDER — ACETAMINOPHEN 500 MG
3 TABLET ORAL
Qty: 0 | Refills: 0 | DISCHARGE
Start: 2023-01-02

## 2023-01-02 RX ORDER — SIMETHICONE 80 MG/1
80 TABLET, CHEWABLE ORAL EVERY 4 HOURS
Refills: 0 | Status: DISCONTINUED | OUTPATIENT
Start: 2023-01-02 | End: 2023-01-04

## 2023-01-02 RX ORDER — DIBUCAINE 1 %
1 OINTMENT (GRAM) RECTAL EVERY 6 HOURS
Refills: 0 | Status: DISCONTINUED | OUTPATIENT
Start: 2023-01-02 | End: 2023-01-04

## 2023-01-02 RX ORDER — KETOROLAC TROMETHAMINE 30 MG/ML
30 SYRINGE (ML) INJECTION ONCE
Refills: 0 | Status: DISCONTINUED | OUTPATIENT
Start: 2023-01-02 | End: 2023-01-02

## 2023-01-02 RX ORDER — PRAMOXINE HYDROCHLORIDE 150 MG/15G
1 AEROSOL, FOAM RECTAL EVERY 4 HOURS
Refills: 0 | Status: DISCONTINUED | OUTPATIENT
Start: 2023-01-02 | End: 2023-01-04

## 2023-01-02 RX ORDER — IBUPROFEN 200 MG
600 TABLET ORAL EVERY 6 HOURS
Refills: 0 | Status: COMPLETED | OUTPATIENT
Start: 2023-01-02 | End: 2023-12-01

## 2023-01-02 RX ORDER — ACETAMINOPHEN 500 MG
975 TABLET ORAL
Refills: 0 | Status: DISCONTINUED | OUTPATIENT
Start: 2023-01-02 | End: 2023-01-04

## 2023-01-02 RX ORDER — DIPHENHYDRAMINE HCL 50 MG
25 CAPSULE ORAL EVERY 6 HOURS
Refills: 0 | Status: DISCONTINUED | OUTPATIENT
Start: 2023-01-02 | End: 2023-01-04

## 2023-01-02 RX ORDER — SODIUM CHLORIDE 9 MG/ML
3 INJECTION INTRAMUSCULAR; INTRAVENOUS; SUBCUTANEOUS EVERY 8 HOURS
Refills: 0 | Status: DISCONTINUED | OUTPATIENT
Start: 2023-01-02 | End: 2023-01-04

## 2023-01-02 RX ORDER — HYDROCORTISONE 1 %
1 OINTMENT (GRAM) TOPICAL EVERY 6 HOURS
Refills: 0 | Status: DISCONTINUED | OUTPATIENT
Start: 2023-01-02 | End: 2023-01-04

## 2023-01-02 RX ORDER — IBUPROFEN 200 MG
600 TABLET ORAL EVERY 6 HOURS
Refills: 0 | Status: DISCONTINUED | OUTPATIENT
Start: 2023-01-02 | End: 2023-01-04

## 2023-01-02 RX ORDER — OXYTOCIN 10 UNIT/ML
41.67 VIAL (ML) INJECTION
Qty: 20 | Refills: 0 | Status: DISCONTINUED | OUTPATIENT
Start: 2023-01-02 | End: 2023-01-02

## 2023-01-02 RX ORDER — MAGNESIUM HYDROXIDE 400 MG/1
30 TABLET, CHEWABLE ORAL
Refills: 0 | Status: DISCONTINUED | OUTPATIENT
Start: 2023-01-02 | End: 2023-01-04

## 2023-01-02 RX ORDER — TETANUS TOXOID, REDUCED DIPHTHERIA TOXOID AND ACELLULAR PERTUSSIS VACCINE, ADSORBED 5; 2.5; 8; 8; 2.5 [IU]/.5ML; [IU]/.5ML; UG/.5ML; UG/.5ML; UG/.5ML
0.5 SUSPENSION INTRAMUSCULAR ONCE
Refills: 0 | Status: DISCONTINUED | OUTPATIENT
Start: 2023-01-02 | End: 2023-01-04

## 2023-01-02 RX ORDER — URSODIOL 250 MG/1
0 TABLET, FILM COATED ORAL
Qty: 0 | Refills: 0 | DISCHARGE

## 2023-01-02 RX ORDER — AER TRAVELER 0.5 G/1
1 SOLUTION RECTAL; TOPICAL EVERY 4 HOURS
Refills: 0 | Status: DISCONTINUED | OUTPATIENT
Start: 2023-01-02 | End: 2023-01-04

## 2023-01-02 RX ADMIN — Medication 1 TABLET(S): at 12:49

## 2023-01-02 RX ADMIN — Medication 600 MILLIGRAM(S): at 23:00

## 2023-01-02 RX ADMIN — Medication 125 MILLIUNIT(S)/MIN: at 06:37

## 2023-01-02 RX ADMIN — SODIUM CHLORIDE 3 MILLILITER(S): 9 INJECTION INTRAMUSCULAR; INTRAVENOUS; SUBCUTANEOUS at 08:18

## 2023-01-02 RX ADMIN — Medication 975 MILLIGRAM(S): at 18:54

## 2023-01-02 RX ADMIN — Medication 600 MILLIGRAM(S): at 22:24

## 2023-01-02 RX ADMIN — Medication 30 MILLIGRAM(S): at 08:44

## 2023-01-02 RX ADMIN — SODIUM CHLORIDE 3 MILLILITER(S): 9 INJECTION INTRAMUSCULAR; INTRAVENOUS; SUBCUTANEOUS at 14:00

## 2023-01-02 RX ADMIN — Medication 208 GRAM(S): at 01:58

## 2023-01-02 RX ADMIN — Medication 975 MILLIGRAM(S): at 12:48

## 2023-01-02 RX ADMIN — Medication 975 MILLIGRAM(S): at 13:30

## 2023-01-02 NOTE — OB PROVIDER DELIVERY SUMMARY - NSSELHIDDEN_OBGYN_ALL_OB_FT
[NS_DeliveryAttending1_OBGYN_ALL_OB_FT:[id number]],[NS_DeliveryRN_OBGYN_ALL_OB_FT:[id number]==]

## 2023-01-02 NOTE — DISCHARGE NOTE OB - NS MD DC FALL RISK RISK
For information on Fall & Injury Prevention, visit: [email or web address] OR  [email or web address] OR  https://www.cdc.gov/steadi/patient.html

## 2023-01-02 NOTE — DISCHARGE NOTE OB - MATERIALS PROVIDED
Vaccinations/[facility]  Screening Program/  Immunization Record/Guide to Postpartum Care/[facility] Hearing Screen Program/Shaken Baby Prevention Handout/Birth Certificate Instructions

## 2023-01-02 NOTE — OB PROVIDER DELIVERY SUMMARY - NSPROVIDERDELIVERYNOTE_OBGYN_ALL_OB_FT
Pt found to be fully dilated with urge to push,  of viable male infant. Head, shoulders and body delivered easily in OA position.  Cord clamping was delayed and then cut uncomplicated. Placenta delivered intact. Vaginal exam revealed intact cervix, vaginal walls, sulci. 2nd degree laceration identified and repaired in usual fashion with chromic suture. Bimanual exam performed, with minimal clot evacuated. Fundus and lower uterine segment firm. Excellent hemostasis.  Dylan Marie PGY1 Pt found to be fully dilated with urge to push,  of viable male infant. Head, shoulders and body delivered easily in OA position.  Cord clamping was delayed and then cut uncomplicated. Cord gases obtained. Placenta delivered intact. Vaginal exam revealed intact cervix, vaginal walls, sulci. 2nd degree laceration identified and repaired in usual fashion with chromic suture. Bimanual exam performed, with minimal clot evacuated. Fundus and lower uterine segment firm. Excellent hemostasis.  Dylan Marie PGY1

## 2023-01-02 NOTE — CHART NOTE - NSCHARTNOTEFT_GEN_A_CORE
Patient seen at bedside   Reports feeling the same   Tracing category 1   [place]: irregular   VE 8/90/-4   Reassess as needed
Patient seen at bedside   s/p epidural   resting comfortably   tracing category 1   [place]: 3-4/10   expectant management   Reassess as needed
Patient tested positive for COVID-19 on 12/12.  Repeat testing not indicated but performed on admission, patient still COVID-19 positive on 1/2. Patient out of quarantine window and can be considered negative for transfer to postpartum floor.    Discussed with Dr. Lupillo Segovia PGY1

## 2023-01-02 NOTE — OB RN DELIVERY SUMMARY - NS_SEPSISRSKCALC_OBGYN_ALL_OB_FT
EOS calculated successfully. EOS Risk Factor: 0.5/1000 live births ([facility] national incidence); GA=35w3d; Temp=98.42; ROM=1.8; GBS='Positive'; Antibiotics='Broad spectrum antibiotics > 4 hrs prior to birth'

## 2023-01-02 NOTE — OB RN DELIVERY SUMMARY - NSSELHIDDEN_OBGYN_ALL_OB_FT
[NS_DeliveryAttending1_OBGYN_ALL_OB_FT:[id number]],[NS_DeliveryRN_OBGYN_ALL_OB_FT:[id number]]

## 2023-01-02 NOTE — DISCHARGE NOTE OB - MEDICATION SUMMARY - MEDICATIONS TO TAKE
I will START or STAY ON the medications listed below when I get home from the hospital:  None I will START or STAY ON the medications listed below when I get home from the hospital:    acetaminophen 325 mg oral tablet  -- 3 tab(s) by mouth every 8 hours, As Needed  -- Indication: For Pain    ibuprofen 600 mg oral tablet  -- 1 tab(s) by mouth every 6 hours, As Needed  -- Indication: For Pain

## 2023-01-02 NOTE — DISCHARGE NOTE OB - CARE PLAN
1 Principal Discharge DX:	 (normal spontaneous vaginal delivery)  Assessment and plan of treatment:	as above

## 2023-01-02 NOTE — DISCHARGE NOTE OB - PATIENT PORTAL LINK FT
You can access the FollowMyHealth Patient Portal offered by [facility] by registering at the following website: [email or web address]. By joining [employer]’s FollowMyHealth portal, you will also be able to view your health information using other applications (apps) compatible with our system.

## 2023-01-02 NOTE — DISCHARGE NOTE OB - CARE PROVIDER_API CALL
Racheal Alvarez)  Obstetrics and Gynecology  [address] [place] [address]  Phone: [phone]  Fax: [phone]  Established Patient  Follow Up Time: 1 month

## 2023-01-03 RX ORDER — SENNA PLUS 8.6 MG/1
1 TABLET ORAL
Refills: 0 | Status: DISCONTINUED | OUTPATIENT
Start: 2023-01-03 | End: 2023-01-04

## 2023-01-03 RX ADMIN — Medication 600 MILLIGRAM(S): at 05:12

## 2023-01-03 RX ADMIN — Medication 975 MILLIGRAM(S): at 18:30

## 2023-01-03 RX ADMIN — Medication 600 MILLIGRAM(S): at 14:35

## 2023-01-03 RX ADMIN — SODIUM CHLORIDE 3 MILLILITER(S): 9 INJECTION INTRAMUSCULAR; INTRAVENOUS; SUBCUTANEOUS at 08:00

## 2023-01-03 RX ADMIN — Medication 600 MILLIGRAM(S): at 21:11

## 2023-01-03 RX ADMIN — Medication 975 MILLIGRAM(S): at 17:59

## 2023-01-03 RX ADMIN — Medication 975 MILLIGRAM(S): at 09:20

## 2023-01-03 RX ADMIN — Medication 600 MILLIGRAM(S): at 21:41

## 2023-01-03 RX ADMIN — Medication 600 MILLIGRAM(S): at 14:05

## 2023-01-03 RX ADMIN — Medication 975 MILLIGRAM(S): at 09:50

## 2023-01-03 RX ADMIN — SENNA PLUS 1 TABLET(S): 8.6 TABLET ORAL at 17:59

## 2023-01-03 NOTE — PROGRESS NOTE ADULT - SUBJECTIVE AND OBJECTIVE BOX
Post-partum Note,   She is a  27y woman who is now post-partum day: 1    Subjective:  The patient reports she feels well.  She is ambulating.   She is tolerating regular diet.  She denies nausea and vomiting; denies fever.  She is voiding.  Her pain is controlled.  She reports normal postpartum bleeding.  She is breastfeeding.  She is bonding with baby.     Physical exam:    Vital Signs Last 24 Hrs  T(C): 36.3 (2023 05:46), Max: 37 (2023 23:38)  T(F): 97.3 (2023 05:46), Max: 98.6 (2023 23:38)  HR: 61 (2023 05:46) (61 - 84)  BP: 107/69 (2023 05:46) (96/57 - 110/71)  BP(mean): --  RR: 18 (2023 05:46) (18 - 18)  SpO2: 99% (2023 05:46) (99% - 99%)    Parameters below as of 2023 05:46  Patient On (Oxygen Delivery Method): room air      Pt resting in bed breastfeeding baby.   Gen: NAD  Breast: Soft, nontender, not engorged.  Abdomen: Soft, nontender, no distension , firm uterine fundus at umbilicus.  Pelvic: Normal lochia noted  Ext: No calf tenderness    LABS:                        10.0   8.29  )-----------( 219      ( 2023 18:03 )             33.2       Rubella status:     Allergies    Benadryl (Angioedema)  sulfa drugs (Anaphylaxis; Angioedema)    Intolerances      MEDICATIONS  (STANDING):  acetaminophen     Tablet .. 975 milliGRAM(s) Oral <User Schedule>  diphtheria/tetanus/pertussis (acellular) Vaccine (Adacel) 0.5 milliLiter(s) IntraMuscular once  ibuprofen  Tablet. 600 milliGRAM(s) Oral every 6 hours  prenatal multivitamin 1 Tablet(s) Oral daily  sodium chloride 0.9% lock flush 3 milliLiter(s) IV Push every 8 hours    MEDICATIONS  (PRN):  benzocaine 20%/menthol 0.5% Spray 1 Spray(s) Topical every 6 hours PRN for Perineal discomfort  dibucaine 1% Ointment 1 Application(s) Topical every 6 hours PRN Perineal discomfort  diphenhydrAMINE 25 milliGRAM(s) Oral every 6 hours PRN Pruritus  hydrocortisone 1% Cream 1 Application(s) Topical every 6 hours PRN Moderate Pain (4-6)  lanolin Ointment 1 Application(s) Topical every 6 hours PRN nipple soreness  magnesium hydroxide Suspension 30 milliLiter(s) Oral two times a day PRN Constipation  oxyCODONE    IR 5 milliGRAM(s) Oral every 3 hours PRN Moderate to Severe Pain (4-10)  oxyCODONE    IR 5 milliGRAM(s) Oral once PRN Moderate to Severe Pain (4-10)  pramoxine 1%/zinc 5% Cream 1 Application(s) Topical every 4 hours PRN Moderate Pain (4-6)  simethicone 80 milliGRAM(s) Chew every 4 hours PRN Gas  witch hazel Pads 1 Application(s) Topical every 4 hours PRN Perineal discomfort        Assessment and Plan  PPD #1 s/p   Doing well.  Encourage ambulation.  PP & PPD Instructions reviewed.  PP education materials provided.  D/C home tomorrow.  Plan reviewed with Dr. Wesley.

## 2023-01-04 VITALS
OXYGEN SATURATION: 100 % | DIASTOLIC BLOOD PRESSURE: 62 MMHG | RESPIRATION RATE: 18 BRPM | HEART RATE: 86 BPM | TEMPERATURE: 98 F | SYSTOLIC BLOOD PRESSURE: 110 MMHG

## 2023-01-04 RX ADMIN — Medication 975 MILLIGRAM(S): at 00:03

## 2023-01-04 RX ADMIN — Medication 975 MILLIGRAM(S): at 05:36

## 2023-01-04 RX ADMIN — Medication 975 MILLIGRAM(S): at 00:01

## 2023-01-04 RX ADMIN — Medication 600 MILLIGRAM(S): at 03:21

## 2023-01-04 RX ADMIN — Medication 975 MILLIGRAM(S): at 05:06

## 2023-01-04 RX ADMIN — Medication 600 MILLIGRAM(S): at 02:51

## 2023-01-04 RX ADMIN — SENNA PLUS 1 TABLET(S): 8.6 TABLET ORAL at 05:07

## 2023-01-04 NOTE — PROGRESS NOTE ADULT - ASSESSMENT
Patient seen at bedside resting comfortably offers no current complaints.  Ambulating and voiding without difficulty.  Passing flatus and tolerating regular diet.  both breast/bottle feeding.  Denies HA, CP, SOB, N/V/D, dizziness, palpitations, worsening abdominal pain, worsening vaginal bleeding, or any other concerns.      Vital Signs Last 24 Hrs  T(C): 36.6 (2023 05:35), Max: 36.9 (2023 16:53)  T(F): 97.8 (2023 05:35), Max: 98.4 (2023 16:53)  HR: 86 (2023 05:35) (86 - 90)  BP: 110/62 (2023 05:35) (110/62 - 123/76)  BP(mean): --  RR: 18 (2023 05:35) (18 - 18)  SpO2: 100% (2023 05:35) (100% - 100%)        Physical Exam:     Gen: A&Ox 3, NAD  Chest: CTA B/L  Cardiac: S1,S2; RRR  Breast: Soft, nontender, nonengorged  Abdomen: +BS; Soft, nontender, ND; Fundus firm below umbilicus  Gyn: Min lochia  Ext: Nontender, DTRS 2+, no worsening edema         A/P: 27yr old F PPD#2 s/p  on 2023      -Discharge home with instructions  -Follow up in office in 5-6 weeks for postpartum visit  -PP Materials provided   -d/w dr Miguel Parisi [facility]   [phone]

## 2023-01-09 NOTE — OB PROVIDER H&P - NSANTENATALSTERA_OBGYN_ALL_OB
Dr Elon Brunner notified of new consult via perfect serve and added to care team Not applicable as gestational age is greater than or equal to 34 weeks.

## 2023-01-20 NOTE — OB PROVIDER LABOR PROGRESS NOTE - ASSESSMENT
A/P:   - Labor: IOL for ICP. Sp PO and CB. SROM@1808 to clear fluid. Continue Pitocin per plan with Dr. Goode  - Fetus: Cat 1  - GBS: neg  - Pain: Epi in situ    Adilia Lu, PGY-1  per plan with Dr. Goode  
[facility]...
A/P 25y P0 @36/1 wks IOL  -For Pitocin when able  -Order placed   -s/p CB  -Anticipate     d/w D. Casey Prince PGY-1  
Plan: 26y/o  @36w1d in stable condition  - Con't IOL w/ po cytotec and cervical balloon  - Continuous EFM, [place]  - Con't IVF    D/W attending physician Dr. Miguel Love MD  PGY-1
- Labor: IOL for ICP. Sp PO and CB. Continue Pitocin. Pt on peanut ball.  - Fetus: Cat 1  - GBS: neg  - Pain: Epi in situ    Adilia Lu, PGY-1  D/w Dr. Goode
24 yo P0 @36 weeks, IOL for cholestasis of pregnancy   -c/w PO cytotec and CB for induction  -fetus cat I, continous EFM     E Demirel PGY3  d/w Dr. Rivera

## 2023-02-03 ENCOUNTER — EMERGENCY (EMERGENCY)
Facility: HOSPITAL | Age: 28
LOS: 1 days | Discharge: HOME | End: 2023-02-03
Admitting: EMERGENCY MEDICINE
Payer: COMMERCIAL

## 2023-02-03 VITALS
HEART RATE: 69 BPM | DIASTOLIC BLOOD PRESSURE: 88 MMHG | OXYGEN SATURATION: 99 % | SYSTOLIC BLOOD PRESSURE: 111 MMHG | HEIGHT: 62 IN | RESPIRATION RATE: 18 BRPM | TEMPERATURE: 98 F

## 2023-02-03 PROCEDURE — 99284 EMERGENCY DEPT VISIT MOD MDM: CPT

## 2023-02-03 RX ORDER — OFLOXACIN 0.3 %
1 DROPS OPHTHALMIC (EYE)
Qty: 1 | Refills: 0
Start: 2023-02-03 | End: 2023-02-07

## 2023-02-03 NOTE — ED PROVIDER NOTE - PHYSICAL EXAMINATION
EYES: R eye sclera is injected, PERRL, Fluorescin stain reveals a <1 CM corneal ulcer in the R eye. Neg Luiza's sign, no foreign body. No discharge.

## 2023-02-03 NOTE — ED PROVIDER NOTE - NSFOLLOWUPINSTRUCTIONS_ED_ALL_ED_FT
Use the antibiotic eyedrops as prescribed and use Erythromycin ointment at night.  Follow up with [facility] [address] [place] [address] [id number] [facility] [id number] . Advance activity as tolerated.  Continue all previously prescribed medications as directed.  Follow up with your primary care physician in 48-72 hours- bring copies of your results.  Return to the ER for worsening or persistent symptoms, and/or ANY NEW OR CONCERNING SYMPTOMS. THIS INCLUDES BUT IS NOT LIMITED TO LOSS OR CHANGE OF VISION, FEVER, CHILLS, NIGHTSWEATS OR FOR ANY OTHER SYMPTOMS THAT CONCERN YOU. If you have issues obtaining follow up, please call: [phone] to obtain a doctor or specialist who takes your insurance in your area.  You may call [phone] to make an appointment with the internal medicine clinic.

## 2023-02-03 NOTE — ED PROVIDER NOTE - OBJECTIVE STATEMENT
26 Y/O F denies PMH or PSH states she has been having a sore throat and initially had nasal congestion for the past 5 days. Pt states it is painful to speak or swallow. Pt states she was seen at [facility] and had a neg COVID-19 PCR and RVP and had a neg rapid strep test. Pt states the sx are still present particularly in regard to her throat pain. Pt denies any other sx or acute complaints. 28 Y/O F PMH R corneal ulcer presents with atraumatic R eye pain for the past day. Pt states she is unsure if she scratched her eye removing the contact. Denies other trauma. Pt states she uses contacts (daily wear) and artificial tears. Pt states she had a bottle of ofloxacin from an old corneal abrasion that she used with some relief. Pt notes pain with opening the eye. Pt denies any other sx or acute complaints.

## 2023-02-03 NOTE — ED PROVIDER NOTE - PATIENT PORTAL LINK FT
You can access the FollowMyHealth Patient Portal offered by [facility] by registering at the following website: [email or web address]. By joining [employer]’s FollowMyHealth portal, you will also be able to view your health information using other applications (apps) compatible with our system.

## 2023-02-03 NOTE — ED PROVIDER NOTE - NSICDXPASTMEDICALHX_GEN_ALL_CORE_FT
PAST MEDICAL HISTORY:  Cholestasis      PAST MEDICAL HISTORY:  Cholestasis     Corneal ulcer, right

## 2023-02-03 NOTE — ED PROVIDER NOTE - ENMT, MLM
Airway patent, Nasal mucosa clear. Mouth with normal mucosa. Mild pharyngeal erythema, no exudate. No abscess or stridor. Anterior cervical nodes are not tender.

## 2023-02-03 NOTE — ED PROVIDER NOTE - CLINICAL SUMMARY MEDICAL DECISION MAKING FREE TEXT BOX
28 Y/O F PMH R corneal ulcer presents with atraumatic R eye pain for the past day. Pt states she is unsure if she scratched her eye removing the contact. Denies other trauma. Pt states she uses contacts (daily wear) and artificial tears. Pt with a corneal abrasion noted on exam. Plan is abx drops, D/C with Ophthalmology follow up.

## 2023-02-03 NOTE — ED ADULT TRIAGE NOTE - CHIEF COMPLAINT QUOTE
Pt st" I have pain in rt eye started this morning worsening through out day....I have hx of corneal abrasion feel the same. I was wearing contacts yesterday and don't know if something rubbed off. " + rt eye redddness

## 2023-02-03 NOTE — ED PROVIDER NOTE - MUSCULOSKELETAL, MLM
Spine appears normal, range of motion is not limited, no muscle or joint tenderness Normal muscle bulk and tone.

## 2023-02-06 ENCOUNTER — NON-APPOINTMENT (OUTPATIENT)
Age: 28
End: 2023-02-06

## 2023-02-06 ENCOUNTER — APPOINTMENT (OUTPATIENT)
Dept: OPHTHALMOLOGY | Facility: CLINIC | Age: 28
End: 2023-02-06
Payer: COMMERCIAL

## 2023-02-06 PROBLEM — H16.001 UNSPECIFIED CORNEAL ULCER, RIGHT EYE: Chronic | Status: ACTIVE | Noted: 2023-02-04

## 2023-02-06 PROCEDURE — 92012 INTRM OPH EXAM EST PATIENT: CPT

## 2023-02-08 ENCOUNTER — NON-APPOINTMENT (OUTPATIENT)
Age: 28
End: 2023-02-08

## 2023-02-08 ENCOUNTER — APPOINTMENT (OUTPATIENT)
Dept: OPHTHALMOLOGY | Facility: CLINIC | Age: 28
End: 2023-02-08
Payer: COMMERCIAL

## 2023-02-08 PROCEDURE — 68761 CLOSE TEAR DUCT OPENING: CPT | Mod: E4

## 2023-02-08 PROCEDURE — 92012 INTRM OPH EXAM EST PATIENT: CPT | Mod: 25

## 2023-02-22 ENCOUNTER — APPOINTMENT (OUTPATIENT)
Dept: OPHTHALMOLOGY | Facility: CLINIC | Age: 28
End: 2023-02-22

## 2023-04-06 NOTE — ED ADULT TRIAGE NOTE - PAIN RATING/NUMBER SCALE (0-10): ACTIVITY
Instructions: This plan will send the code FBSE to the PM system.  DO NOT or CHANGE the price. Price (Do Not Change): 0.00 Detail Level: Simple 5

## 2023-08-29 NOTE — OB PROVIDER TRIAGE NOTE - HISTORY OF PRESENT ILLNESS
abdominal pain 26yo  @ 35.2 presents with c/o painful contractions every 3 minutes. Denies LOF, VB and reports GFM.   Pregnancy complicated by by cholestasis- on ursidiol 300mg TID  GBS bactiuria  Fully vaccinated for covid-- H/O covid Dec 2021    H/O Blood transfusion after first baby-                  Has Anti E and C antibodies

## 2023-09-27 NOTE — OB RN PATIENT PROFILE - HEIGHT IN FEET
5 Ketoconazole Pregnancy And Lactation Text: This medication is Pregnancy Category C and it isn't know if it is safe during pregnancy. It is also excreted in breast milk and breast feeding isn't recommended.

## 2023-10-04 NOTE — OB RN TRIAGE NOTE - NS_TRIAGEMEDICALSCREENINGPERFORMEDBY_OBGYN_ALL_OB_FT
no hearing difficulty/no ear pain/no nasal congestion/no nasal discharge/no nasal obstruction/no nose bleeds/no throat pain/no dysphagia
Eric SHEPPARD

## 2023-12-04 NOTE — OB RN DELIVERY SUMMARY - BABY A: APGAR 1 MIN SCORE, DELIVERY
9

## 2024-04-10 NOTE — OB RN TRIAGE NOTE - TEMPERATURE IN FAHRENHEIT (DEGREES F)
Alert-The patient is alert, awake and responds to voice. The patient is oriented to time, place, and person. The triage nurse is able to obtain subjective information. 97.7

## 2025-06-22 NOTE — OB RN TRIAGE NOTE - NS PRO ABUSE SCREEN SUSPICION NEGLECT YN
Vital Signs: I have reviewed the initial vital signs.  Constitutional: well-nourished, appears stated age, no acute distress  Gastrointestinal: soft, non-tender abdomen, no palpable organomegaly  Neurologic: awake, alert, normal tone, moving all extremities
no